# Patient Record
Sex: FEMALE | Race: ASIAN | NOT HISPANIC OR LATINO | URBAN - METROPOLITAN AREA
[De-identification: names, ages, dates, MRNs, and addresses within clinical notes are randomized per-mention and may not be internally consistent; named-entity substitution may affect disease eponyms.]

---

## 2018-07-28 ENCOUNTER — INPATIENT (INPATIENT)
Facility: HOSPITAL | Age: 58
LOS: 3 days | Discharge: HOME | DRG: 868 | End: 2018-08-01
Attending: INTERNAL MEDICINE | Admitting: INTERNAL MEDICINE
Payer: MEDICAID

## 2018-07-28 VITALS
TEMPERATURE: 99 F | RESPIRATION RATE: 19 BRPM | SYSTOLIC BLOOD PRESSURE: 136 MMHG | HEART RATE: 138 BPM | HEIGHT: 61 IN | DIASTOLIC BLOOD PRESSURE: 80 MMHG | OXYGEN SATURATION: 100 % | WEIGHT: 102.96 LBS

## 2018-07-28 DIAGNOSIS — R00.2 PALPITATIONS: ICD-10-CM

## 2018-07-28 LAB
ALBUMIN SERPL ELPH-MCNC: 3.6 G/DL — SIGNIFICANT CHANGE UP (ref 3.3–5)
ALP SERPL-CCNC: 145 U/L — HIGH (ref 40–120)
ALT FLD-CCNC: 18 U/L — SIGNIFICANT CHANGE UP (ref 10–45)
ANION GAP SERPL CALC-SCNC: 17 MMOL/L — SIGNIFICANT CHANGE UP (ref 5–17)
ANISOCYTOSIS BLD QL: SIGNIFICANT CHANGE UP
APPEARANCE UR: CLEAR — SIGNIFICANT CHANGE UP
APTT BLD: 31.4 SEC — SIGNIFICANT CHANGE UP (ref 27.5–37.4)
AST SERPL-CCNC: 28 U/L — SIGNIFICANT CHANGE UP (ref 10–40)
BASOPHILS # BLD AUTO: 0 K/UL — SIGNIFICANT CHANGE UP (ref 0–0.2)
BILIRUB SERPL-MCNC: 1.3 MG/DL — HIGH (ref 0.2–1.2)
BILIRUB UR-MCNC: NEGATIVE — SIGNIFICANT CHANGE UP
BUN SERPL-MCNC: 16 MG/DL — SIGNIFICANT CHANGE UP (ref 7–23)
CALCIUM SERPL-MCNC: 9 MG/DL — SIGNIFICANT CHANGE UP (ref 8.4–10.5)
CHLORIDE SERPL-SCNC: 96 MMOL/L — SIGNIFICANT CHANGE UP (ref 96–108)
CO2 SERPL-SCNC: 21 MMOL/L — LOW (ref 22–31)
COLOR SPEC: YELLOW — SIGNIFICANT CHANGE UP
CREAT SERPL-MCNC: 0.76 MG/DL — SIGNIFICANT CHANGE UP (ref 0.5–1.3)
DIFF PNL FLD: NEGATIVE — SIGNIFICANT CHANGE UP
EOSINOPHIL # BLD AUTO: 0 K/UL — SIGNIFICANT CHANGE UP (ref 0–0.5)
GAS PNL BLDV: SIGNIFICANT CHANGE UP
GLUCOSE SERPL-MCNC: 156 MG/DL — HIGH (ref 70–99)
GLUCOSE UR QL: >1000 MG/DL
HCT VFR BLD CALC: 26.1 % — LOW (ref 34.5–45)
HGB BLD-MCNC: 8.2 G/DL — LOW (ref 11.5–15.5)
INR BLD: 1.04 RATIO — SIGNIFICANT CHANGE UP (ref 0.88–1.16)
KETONES UR-MCNC: ABNORMAL
LEUKOCYTE ESTERASE UR-ACNC: NEGATIVE — SIGNIFICANT CHANGE UP
LYMPHOCYTES # BLD AUTO: 2.4 K/UL — SIGNIFICANT CHANGE UP (ref 1–3.3)
LYMPHOCYTES # BLD AUTO: 21 % — SIGNIFICANT CHANGE UP (ref 13–44)
MCHC RBC-ENTMCNC: 30.9 PG — SIGNIFICANT CHANGE UP (ref 27–34)
MCHC RBC-ENTMCNC: 31.6 GM/DL — LOW (ref 32–36)
MCV RBC AUTO: 97.8 FL — SIGNIFICANT CHANGE UP (ref 80–100)
METAMYELOCYTES # FLD: 3 % — HIGH (ref 0–0)
MONOCYTES # BLD AUTO: 1.8 K/UL — HIGH (ref 0–0.9)
MONOCYTES NFR BLD AUTO: 26 % — HIGH (ref 2–14)
MYELOCYTES NFR BLD: 2 % — HIGH (ref 0–0)
NEUTROPHILS # BLD AUTO: 2.8 K/UL — SIGNIFICANT CHANGE UP (ref 1.8–7.4)
NEUTROPHILS NFR BLD AUTO: 45 % — SIGNIFICANT CHANGE UP (ref 43–77)
NEUTS BAND # BLD: 2 % — SIGNIFICANT CHANGE UP (ref 0–8)
NITRITE UR-MCNC: NEGATIVE — SIGNIFICANT CHANGE UP
NRBC # BLD: 3 /100 — HIGH (ref 0–0)
NT-PROBNP SERPL-SCNC: 28 PG/ML — SIGNIFICANT CHANGE UP (ref 0–300)
PH UR: 6.5 — SIGNIFICANT CHANGE UP (ref 5–8)
PLAT MORPH BLD: NORMAL — SIGNIFICANT CHANGE UP
PLATELET # BLD AUTO: 347 K/UL — SIGNIFICANT CHANGE UP (ref 150–400)
POIKILOCYTOSIS BLD QL AUTO: SLIGHT — SIGNIFICANT CHANGE UP
POLYCHROMASIA BLD QL SMEAR: SLIGHT — SIGNIFICANT CHANGE UP
POTASSIUM SERPL-MCNC: 3.7 MMOL/L — SIGNIFICANT CHANGE UP (ref 3.5–5.3)
POTASSIUM SERPL-SCNC: 3.7 MMOL/L — SIGNIFICANT CHANGE UP (ref 3.5–5.3)
PROT SERPL-MCNC: 7.8 G/DL — SIGNIFICANT CHANGE UP (ref 6–8.3)
PROT UR-MCNC: SIGNIFICANT CHANGE UP
PROTHROM AB SERPL-ACNC: 11.4 SEC — SIGNIFICANT CHANGE UP (ref 9.8–12.7)
RBC # BLD: 2.67 M/UL — LOW (ref 3.8–5.2)
RBC # FLD: 19.2 % — HIGH (ref 10.3–14.5)
RBC BLD AUTO: NORMAL — SIGNIFICANT CHANGE UP
RBC CASTS # UR COMP ASSIST: SIGNIFICANT CHANGE UP /HPF (ref 0–2)
SODIUM SERPL-SCNC: 134 MMOL/L — LOW (ref 135–145)
SP GR SPEC: >1.03 — HIGH (ref 1.01–1.02)
TROPONIN T, HIGH SENSITIVITY RESULT: <6 NG/L — SIGNIFICANT CHANGE UP (ref 0–51)
UROBILINOGEN FLD QL: NEGATIVE — SIGNIFICANT CHANGE UP
VARIANT LYMPHS # BLD: 1 % — SIGNIFICANT CHANGE UP (ref 0–6)
WBC # BLD: 7 K/UL — SIGNIFICANT CHANGE UP (ref 3.8–10.5)
WBC # FLD AUTO: 7 K/UL — SIGNIFICANT CHANGE UP (ref 3.8–10.5)
WBC UR QL: SIGNIFICANT CHANGE UP /HPF (ref 0–5)

## 2018-07-28 PROCEDURE — 71045 X-RAY EXAM CHEST 1 VIEW: CPT | Mod: 26

## 2018-07-28 PROCEDURE — 99285 EMERGENCY DEPT VISIT HI MDM: CPT | Mod: 25

## 2018-07-28 PROCEDURE — 93010 ELECTROCARDIOGRAM REPORT: CPT

## 2018-07-28 PROCEDURE — 71275 CT ANGIOGRAPHY CHEST: CPT | Mod: 26

## 2018-07-28 RX ORDER — DEXTROSE 50 % IN WATER 50 %
25 SYRINGE (ML) INTRAVENOUS ONCE
Qty: 0 | Refills: 0 | Status: DISCONTINUED | OUTPATIENT
Start: 2018-07-28 | End: 2018-08-01

## 2018-07-28 RX ORDER — INSULIN LISPRO 100/ML
VIAL (ML) SUBCUTANEOUS
Qty: 0 | Refills: 0 | Status: DISCONTINUED | OUTPATIENT
Start: 2018-07-28 | End: 2018-08-01

## 2018-07-28 RX ORDER — GLUCAGON INJECTION, SOLUTION 0.5 MG/.1ML
1 INJECTION, SOLUTION SUBCUTANEOUS ONCE
Qty: 0 | Refills: 0 | Status: DISCONTINUED | OUTPATIENT
Start: 2018-07-28 | End: 2018-08-01

## 2018-07-28 RX ORDER — SODIUM CHLORIDE 9 MG/ML
1000 INJECTION INTRAMUSCULAR; INTRAVENOUS; SUBCUTANEOUS ONCE
Qty: 0 | Refills: 0 | Status: COMPLETED | OUTPATIENT
Start: 2018-07-28 | End: 2018-07-28

## 2018-07-28 RX ORDER — DEXTROSE 50 % IN WATER 50 %
12.5 SYRINGE (ML) INTRAVENOUS ONCE
Qty: 0 | Refills: 0 | Status: DISCONTINUED | OUTPATIENT
Start: 2018-07-28 | End: 2018-08-01

## 2018-07-28 RX ORDER — DEXTROSE 50 % IN WATER 50 %
15 SYRINGE (ML) INTRAVENOUS ONCE
Qty: 0 | Refills: 0 | Status: DISCONTINUED | OUTPATIENT
Start: 2018-07-28 | End: 2018-08-01

## 2018-07-28 RX ORDER — SODIUM CHLORIDE 9 MG/ML
1000 INJECTION INTRAMUSCULAR; INTRAVENOUS; SUBCUTANEOUS
Qty: 0 | Refills: 0 | Status: DISCONTINUED | OUTPATIENT
Start: 2018-07-28 | End: 2018-07-31

## 2018-07-28 RX ORDER — ATORVASTATIN CALCIUM 80 MG/1
10 TABLET, FILM COATED ORAL AT BEDTIME
Qty: 0 | Refills: 0 | Status: DISCONTINUED | OUTPATIENT
Start: 2018-07-28 | End: 2018-08-01

## 2018-07-28 RX ORDER — SODIUM CHLORIDE 9 MG/ML
3 INJECTION INTRAMUSCULAR; INTRAVENOUS; SUBCUTANEOUS ONCE
Qty: 0 | Refills: 0 | Status: COMPLETED | OUTPATIENT
Start: 2018-07-28 | End: 2018-07-28

## 2018-07-28 RX ORDER — SODIUM CHLORIDE 9 MG/ML
1000 INJECTION, SOLUTION INTRAVENOUS
Qty: 0 | Refills: 0 | Status: DISCONTINUED | OUTPATIENT
Start: 2018-07-28 | End: 2018-08-01

## 2018-07-28 RX ADMIN — SODIUM CHLORIDE 3 MILLILITER(S): 9 INJECTION INTRAMUSCULAR; INTRAVENOUS; SUBCUTANEOUS at 13:59

## 2018-07-28 RX ADMIN — ATORVASTATIN CALCIUM 10 MILLIGRAM(S): 80 TABLET, FILM COATED ORAL at 21:28

## 2018-07-28 RX ADMIN — SODIUM CHLORIDE 1000 MILLILITER(S): 9 INJECTION INTRAMUSCULAR; INTRAVENOUS; SUBCUTANEOUS at 13:59

## 2018-07-28 NOTE — ED ADULT NURSE REASSESSMENT NOTE - NS ED NURSE REASSESS COMMENT FT1
Received awake alert and orientedx3 Resp even and nonlab Denies chest pain or sob CTA done Pt states " I feel much better," per daughter

## 2018-07-28 NOTE — ED ADULT NURSE NOTE - NSSISCREENINGQ4_ED_A_ED
General Clinic Visit  Date: 5/10/2017  PCP: Alysa Jones DO    Chief Complaint   Patient presents with   • Skin Tag   • ER F/U     kidney stone    • Ankle Injury       HPI: Fátima Wetzel is a 43 year old year old female with a PMHx of skin tags presenting to the [facility] unaccompanied for the following:    ED F/U:  - day after our last visit today, she woke up and got out of bed and sprained her ankle. Was evaled in the ED - no fx. Has been able to go without brace at home, but still wears it outside of the home  -THEN had nephrolithiasis, seen in ED at [facility], passed stone in the ED, no subsequent problems    Skin tag removal:  - has two irritated skin tags near bra line that have been getting caught and causing pain/irritation  - she is here for removal of those today      ROS:  no fever, headache, chest pain, SOB, abdominal pain, N/V/D/C, peripheral edema     Problem List, Pertinent surgical, family history, Allergies, Current Medications, as well as nursing notes were all reviewed.    PHYSICAL EXAM:  Visit Vitals   • /80 [place] [facility] [place]itting, Cuff Size: Regular)   • Pulse 72   • Temp 97.2 °F (36.2 °C) (Tympanic)   • Resp 20   • Wt 107.3 kg   • LMP 01/14/2016   • SpO2 99%   • BMI 33.93 kg/m2     General: NAD, pleasant and interactive  Skin: warm and dry, no rash, 3 mm skin tag lower outer quadrant right breast and 3 mm skin tag just anterior to midaxillary line at inferior aspect of axilla.  HEENT: sclera noninjected, PERRL,mucous membranes moist  Neck:  supple, no lymphadenopathy  CV: RRR, normal S1/S2, no murmurs/rubs/gallops  Resp: CTAB, no wheeze/rales/rhonchi  Abd: soft, NT/ND, normoactive bowel sounds throughout, no rebound or guarding  Msk: no cyanosis or edema, moves all extremities spontaneously. Left ankle only very slightly swollen. Neurovascularly intact. Good ROM. Slightly TTP.   Neuro: CN 2-12 grossly intact, no focal deficits  Psych: mood and affect  appropriate    ASSESSMENT & PLAN:  Fátima was seen today for skin tag, er f/u and ankle injury.    Diagnoses and all orders for this visit:    Benign neoplasm of skin of trunk  -     DESTRUCTION BENIGN LESIONS 1-14 LESIONS [[id number]]  - Procedure:    - 2 skin tags identified. Verbal informed consent obtained. Liquid nitrogen applied via q-tip until ice ball formed and extended 2 mm beyond outer edge of base of skin tag x 2 cycles. Patient tolerated well. Anticipatory guidance about progression of lesion healing discussed in detail.     Sprain of left ankle, unspecified ligament, subsequent encounter  Healing appropriately. Discussed ROM exercises - abc's. OK to d/c brace as tolerated.    Nephrolithiasis   Resolved. continue to drink plenty of water with lemon.     Health Maintenance:  · Cervical Cancer Screening - NA, had total hysterectomy 2016  · Mammogram - due 9/2017  · Immunizations: Up to date.   · HIV screening ages 15-65: NR 2/5/13  · High Blood pressure screening: WNL today    FOLLOW-UP:  Patient is to return in 1-2 months for discussion about weight loss.    Staffed with Dr. Daniels, who is in agreement with the above assessment and plan.    Alysa Jones, DO  PGY3 Family Medicine  5/10/2017     No

## 2018-07-28 NOTE — ED PROVIDER NOTE - MEDICAL DECISION MAKING DETAILS
Attending MD Lloyd: 58F with ho DM, HTN presenting with palpitations x 2-3 weeks, fatigue, weight loss, OLIVARES, NO chest pain contrary to triage note, pt tachy to 130s, sinus tach, inferolateral TWI, ddx includes PE, thyrotoxicosis, lyte derangements, less likely ACS. Plan for CTA chest given high suspicion for PE, labs, TSH, CXR and re-eval

## 2018-07-28 NOTE — ED ADULT NURSE NOTE - OBJECTIVE STATEMENT
Pt reports 3 wks of weakness, dizziness that occurred 3 wks ago and fell hitting the back of head. Reports 3 wks of overall tiredness and weak. Reports slight chest discomfort with the feeling of heart racing, denies chest pain. Pt on CM, Sinus Tachycardia, denies sob, fever, chills, headache, dizziness recently. Pt speech clear, strength to all four extr. IV placed, on CM.

## 2018-07-28 NOTE — ED PROVIDER NOTE - PHYSICAL EXAMINATION
NAD, Tachycardia, Afebrile, + PERRL, No spinal tender, ABD soft, non tender, no CVA tender, Neuro- intact.

## 2018-07-28 NOTE — H&P ADULT - HISTORY OF PRESENT ILLNESS
57yo female pt with PMHx of HTN, HLD, DM c/o palpitation, fatigue and OLIVARES for 2 weeks  . Pt stated she felt racing heart beats 2 weeks ago and they've worsen gradually with fatigue/ OLIVARES since one week  . Pt's evaluated by urgent care  today and sent to ED.   Denies CP or syncopal episode. Denies headache, dizziness or visual changes.   Denies neck or back pain. Denies sensory changes or weakness to extremities. Denies fever, chills

## 2018-07-28 NOTE — ED ADULT TRIAGE NOTE - CHIEF COMPLAINT QUOTE
She felt like her heart was racing and [facility] and [facility] here. Pt denies chest pain or sob

## 2018-07-28 NOTE — H&P ADULT - PMH
Essential hypertension    Type 2 diabetes mellitus with other neurologic complication, without long-term current use of insulin

## 2018-07-28 NOTE — ED PROVIDER NOTE - OBJECTIVE STATEMENT
59yo female pt with PMHx of HTN, HLD, DM c/o palpitation, fatigue and OLIVARES for 2 weeks. Pt stated she felt mild racing heart beats 2 weeks ago and they've worsen gradually with fatigue/ OLIVARES since one week. Pt's evaluated by UC today and sent to ED. Denies CP or syncopal episode. Denies headache, dizziness or visual changes. Denies neck or back pain. Denies sensory changes or weakness to extremities. Denies fever, chills or cough. Denies urinary problem.

## 2018-07-28 NOTE — H&P ADULT - ASSESSMENT
pt w/ weakness fatigue found to be tachycardic and anemic  guauaic neg as per er  anemia w/u  gi eval  hold bp meds  check tsh/  echo  orthostatics  cards eval  tele  dm  fsg riss  hold oral meds

## 2018-07-28 NOTE — ED ADULT NURSE NOTE - CHPI ED SYMPTOMS NEG
no nausea/no syncope/no vomiting/no chest pain/no cough/no fever/no diaphoresis/no chills/no back pain/no shortness of breath

## 2018-07-28 NOTE — H&P ADULT - NSHPLABSRESULTS_GEN_ALL_CORE
8.2    7.0   )-----------( 347      ( 28 Jul 2018 14:00 )             26.1       07-28    134<L>  |  96  |  16  ----------------------------<  156<H>  3.7   |  21<L>  |  0.76    Ca    9.0      28 Jul 2018 14:00    TPro  7.8  /  Alb  3.6  /  TBili  1.3<H>  /  DBili  x   /  AST  28  /  ALT  18  /  AlkPhos  145<H>  07-28                  PT/INR - ( 28 Jul 2018 14:00 )   PT: 11.4 sec;   INR: 1.04 ratio         PTT - ( 28 Jul 2018 14:00 )  PTT:31.4 sec    Lactate Trend            CAPILLARY BLOOD GLUCOSE      POCT Blood Glucose.: 266 mg/dL (28 Jul 2018 18:29)

## 2018-07-28 NOTE — ED PROVIDER NOTE - ATTENDING CONTRIBUTION TO CARE
Attending MD Lloyd:  I personally have seen and examined this patient.  NP note reviewed and agree on plan of care and except where noted.  See HPI, PE, and MDM for details.

## 2018-07-29 DIAGNOSIS — D64.9 ANEMIA, UNSPECIFIED: ICD-10-CM

## 2018-07-29 LAB
ANION GAP SERPL CALC-SCNC: 11 MMOL/L — SIGNIFICANT CHANGE UP (ref 5–17)
BILIRUB DIRECT SERPL-MCNC: 0.3 MG/DL — HIGH (ref 0–0.2)
BILIRUB INDIRECT FLD-MCNC: 0.8 MG/DL — SIGNIFICANT CHANGE UP (ref 0.2–1)
BILIRUB SERPL-MCNC: 1.1 MG/DL — SIGNIFICANT CHANGE UP (ref 0.2–1.2)
BLD GP AB SCN SERPL QL: NEGATIVE — SIGNIFICANT CHANGE UP
BUN SERPL-MCNC: 11 MG/DL — SIGNIFICANT CHANGE UP (ref 7–23)
CALCIUM SERPL-MCNC: 7.8 MG/DL — LOW (ref 8.4–10.5)
CHLORIDE SERPL-SCNC: 103 MMOL/L — SIGNIFICANT CHANGE UP (ref 96–108)
CO2 SERPL-SCNC: 22 MMOL/L — SIGNIFICANT CHANGE UP (ref 22–31)
CREAT SERPL-MCNC: 0.5 MG/DL — SIGNIFICANT CHANGE UP (ref 0.5–1.3)
DAT POLY-SP REAG RBC QL: NEGATIVE — SIGNIFICANT CHANGE UP
FERRITIN SERPL-MCNC: 1149 NG/ML — HIGH (ref 15–150)
FOLATE SERPL-MCNC: 11.9 NG/ML — SIGNIFICANT CHANGE UP
FOLATE SERPL-MCNC: 13.1 NG/ML — SIGNIFICANT CHANGE UP
GLUCOSE SERPL-MCNC: 127 MG/DL — HIGH (ref 70–99)
HAPTOGLOB SERPL-MCNC: <20 MG/DL — LOW (ref 34–200)
HAPTOGLOB SERPL-MCNC: <20 MG/DL — LOW (ref 34–200)
HBA1C BLD-MCNC: 4.5 % — SIGNIFICANT CHANGE UP (ref 4–5.6)
HCT VFR BLD CALC: 22.6 % — LOW (ref 34.5–45)
HCT VFR BLD CALC: 22.7 % — LOW (ref 34.5–45)
HGB BLD-MCNC: 6.9 G/DL — CRITICAL LOW (ref 11.5–15.5)
HGB BLD-MCNC: 7.5 G/DL — LOW (ref 11.5–15.5)
IRON SATN MFR SERPL: 24 % — SIGNIFICANT CHANGE UP (ref 14–50)
IRON SATN MFR SERPL: 55 UG/DL — SIGNIFICANT CHANGE UP (ref 30–160)
MCHC RBC-ENTMCNC: 30.5 GM/DL — LOW (ref 32–36)
MCHC RBC-ENTMCNC: 31.2 PG — SIGNIFICANT CHANGE UP (ref 27–34)
MCHC RBC-ENTMCNC: 32.3 PG — SIGNIFICANT CHANGE UP (ref 27–34)
MCHC RBC-ENTMCNC: 33.1 GM/DL — SIGNIFICANT CHANGE UP (ref 32–36)
MCV RBC AUTO: 102.3 FL — HIGH (ref 80–100)
MCV RBC AUTO: 97.6 FL — SIGNIFICANT CHANGE UP (ref 80–100)
NRBC # BLD: 2 /100 WBCS — HIGH (ref 0–0)
PLATELET # BLD AUTO: 362 K/UL — SIGNIFICANT CHANGE UP (ref 150–400)
PLATELET # BLD AUTO: 368 K/UL — SIGNIFICANT CHANGE UP (ref 150–400)
POTASSIUM SERPL-MCNC: 3.6 MMOL/L — SIGNIFICANT CHANGE UP (ref 3.5–5.3)
POTASSIUM SERPL-SCNC: 3.6 MMOL/L — SIGNIFICANT CHANGE UP (ref 3.5–5.3)
RBC # BLD: 2.21 M/UL — LOW (ref 3.8–5.2)
RBC # BLD: 2.33 M/UL — LOW (ref 3.8–5.2)
RBC # FLD: 18.9 % — HIGH (ref 10.3–14.5)
RBC # FLD: 22.3 % — HIGH (ref 10.3–14.5)
RH IG SCN BLD-IMP: POSITIVE — SIGNIFICANT CHANGE UP
RH IG SCN BLD-IMP: POSITIVE — SIGNIFICANT CHANGE UP
SODIUM SERPL-SCNC: 136 MMOL/L — SIGNIFICANT CHANGE UP (ref 135–145)
TIBC SERPL-MCNC: 233 UG/DL — SIGNIFICANT CHANGE UP (ref 220–430)
TSH SERPL-MCNC: 1.16 UIU/ML — SIGNIFICANT CHANGE UP (ref 0.27–4.2)
TSH SERPL-MCNC: 1.63 UIU/ML — SIGNIFICANT CHANGE UP (ref 0.27–4.2)
UIBC SERPL-MCNC: 178 UG/DL — SIGNIFICANT CHANGE UP (ref 110–370)
VIT B12 SERPL-MCNC: 976 PG/ML — SIGNIFICANT CHANGE UP (ref 232–1245)
WBC # BLD: 4 K/UL — SIGNIFICANT CHANGE UP (ref 3.8–10.5)
WBC # BLD: 5 K/UL — SIGNIFICANT CHANGE UP (ref 3.8–10.5)
WBC # FLD AUTO: 4 K/UL — SIGNIFICANT CHANGE UP (ref 3.8–10.5)
WBC # FLD AUTO: 5 K/UL — SIGNIFICANT CHANGE UP (ref 3.8–10.5)

## 2018-07-29 PROCEDURE — 99255 IP/OBS CONSLTJ NEW/EST HI 80: CPT | Mod: GC

## 2018-07-29 PROCEDURE — 70450 CT HEAD/BRAIN W/O DYE: CPT | Mod: 26

## 2018-07-29 RX ORDER — SOD SULF/SODIUM/NAHCO3/KCL/PEG
4000 SOLUTION, RECONSTITUTED, ORAL ORAL ONCE
Qty: 0 | Refills: 0 | Status: COMPLETED | OUTPATIENT
Start: 2018-07-29 | End: 2018-07-29

## 2018-07-29 RX ADMIN — ATORVASTATIN CALCIUM 10 MILLIGRAM(S): 80 TABLET, FILM COATED ORAL at 21:22

## 2018-07-29 RX ADMIN — SODIUM CHLORIDE 60 MILLILITER(S): 9 INJECTION INTRAMUSCULAR; INTRAVENOUS; SUBCUTANEOUS at 05:42

## 2018-07-29 RX ADMIN — Medication 4000 MILLILITER(S): at 15:00

## 2018-07-29 NOTE — CONSULT NOTE ADULT - SUBJECTIVE AND OBJECTIVE BOX
CHIEF COMPLAINT:Patient is a 58y old  Female who presents with a chief complaint of     HISTORY OF PRESENT ILLNESS:  58 female with history as below admitted with palpitation, fatigue   and OLIVARES  found to have profound anemia  denies any chest pain     PAST MEDICAL & SURGICAL HISTORY:  Essential hypertension  Type 2 diabetes mellitus with other neurologic complication, without long-term current use of insulin          MEDICATIONS:            atorvastatin 10 milliGRAM(s) Oral at bedtime  dextrose 40% Gel 15 Gram(s) Oral once PRN  dextrose 50% Injectable 12.5 Gram(s) IV Push once  dextrose 50% Injectable 25 Gram(s) IV Push once  dextrose 50% Injectable 25 Gram(s) IV Push once  glucagon  Injectable 1 milliGRAM(s) IntraMuscular once PRN  insulin lispro (HumaLOG) corrective regimen sliding scale   SubCutaneous three times a day before meals    dextrose 5%. 1000 milliLiter(s) IV Continuous <Continuous>  sodium chloride 0.9%. 1000 milliLiter(s) IV Continuous <Continuous>      FAMILY HISTORY:      Non-contributory    SOCIAL HISTORY:    No tobacco, drugs or etoh    Allergies    No Known Allergies    Intolerances    	    REVIEW OF SYSTEMS:  as above  The rest of the 14 points ROS reviewed and except above they are unremarkable.        PHYSICAL EXAM:  T(C): 36.7 (07-29-18 @ 04:21), Max: 37.1 (07-28-18 @ 18:30)  HR: 97 (07-29-18 @ 04:21) (97 - 138)  BP: 117/73 (07-29-18 @ 04:21) (114/76 - 136/80)  RR: 18 (07-29-18 @ 04:21) (18 - 20)  SpO2: 98% (07-29-18 @ 04:21) (97% - 100%)  Wt(kg): --  I&O's Summary    28 Jul 2018 07:01  -  29 Jul 2018 07:00  --------------------------------------------------------  IN: 660 mL / OUT: 0 mL / NET: 660 mL        Appearance: Normal	  HEENT:   Normal oral mucosa, PERRL, EOMI	  Cardiovascular: Normal S1 S2,    Murmur:   Neck: JVP normal  Respiratory: Lungs clear to auscultation  Gastrointestinal:  Soft, Non-tender, + BS	  Skin: normal   Neuro: No gross deficits.   Psychiatry:  Mood & affect appropriate  Ext: No edema    LABS/DATA:    TELEMETRY: 	    ECG:  	   	  CARDIAC MARKERS:                        <6 <<== 07-28-18 @ 14:00                              8.2    7.0   )-----------( 347      ( 28 Jul 2018 14:00 )             26.1     07-29    136  |  103  |  11  ----------------------------<  127<H>  3.6   |  22  |  0.50    Ca    7.8<L>      29 Jul 2018 05:56    TPro  7.8  /  Alb  3.6  /  TBili  1.3<H>  /  DBili  x   /  AST  28  /  ALT  18  /  AlkPhos  145<H>  07-28    proBNP: Serum Pro-Brain Natriuretic Peptide: 28 pg/mL (07-28 @ 14:00)    Lipid Profile:   HgA1c:   TSH: Thyroid Stimulating Hormone, Serum: 1.16 uIU/mL (07-28 @ 15:01)

## 2018-07-29 NOTE — PROVIDER CONTACT NOTE (CRITICAL VALUE NOTIFICATION) - ACTION/TREATMENT ORDERED:
will continue to monitor pt closely. stat repeat cbc and t&s ordered,will continue to monitor pt closely.

## 2018-07-29 NOTE — PROGRESS NOTE ADULT - ASSESSMENT
pt w/ weakness fatigue found to be tachycardic and anemic  guauaic neg as per er  anemia w/u  gi eval noted  Hb drop noted, possible scope  hold bp meds  TSH normal  echo  orthostatics neg  cards eval noted  tele  dm  fsg riss  hold oral meds  Heme eval

## 2018-07-29 NOTE — CONSULT NOTE ADULT - ASSESSMENT
Problem 1: Anemia  - Progressive weakness with reported normal blood counts 1 month ago  - Suspected hemolytic anemia, Retic 21%, Haptoglobin < 20, TBili mildly elevated iron studies normal  - Please send direct and direct jude test, LDH, fractionated bilirubin  - Will evaluate peripheral smear  - In setting of recent hiking, need rule out tick related illness: babesiosis  - Try to get records for most recent CBC Problem 1: Anemia  - Progressive weakness with reported normal blood counts 1 month ago  - Suspected hemolytic anemia, Retic 21%, Haptoglobin < 20, TBili mildly elevated iron studies normal  - Please send direct and direct jude test, LDH, fractionated bilirubin  - In setting of recent hiking, need rule out tick related illness: babesiosis  - Peripheral Smear Reviewed: RBCs with inclusion bodies suggestive of babesiosis  - Consider empirically treating for babesiosis infection, send confirmatory serological or PCR studies  - Recommend review of outpatient records for most recent CBC

## 2018-07-29 NOTE — CONSULT NOTE ADULT - SUBJECTIVE AND OBJECTIVE BOX
HPI:  57 yo female with PMHx of HTN, HLD, DM initially presented to the ED with complaint of fast heart rate and palpitations. This has been going on for a 3-4 weeks with elevated heart rate, fatigue and dyspnea on exertion that has been progressively worsening. She came into the ED after going to urgent care with a HR of 130. She admits to a fall 1 month ago related to dizziness and use of nyquil. At that time she had a large scalp hematoma, went to PMD who noticed "liver tests" were mildly elevated. Patient is normally very active, spends a great deal of time in her garden and also Hikes with her daughter. Denies blood in urine or stool, bleeding tendencies recent illnesses, chest pain, headache, current dizziness, but gets short of breath with palpitation walking 10 feet.     In the ED she was evaluated for GI bleeding, but was reported to be guaiac negative. Hematology consulted to evaluate for other causes    PAST MEDICAL & SURGICAL HISTORY:  Essential hypertension  Type 2 diabetes mellitus with other neurologic complication, without long-term current use of insulin    General: denies fevers, chills, +Fatgiue  Skin/Breast: denies rash   Ophthalmologic: denies blurry vision  ENMT: denies throat pain  Respiratory and Thorax: denies cough, denies shortness of breath  Cardiovascular: Denies LE swelling, denies chest pain, + palpitations  Gastrointestinal: denies abdominal pain/ nausea/ vomiting/ diarrhea. Denies BRBPR/ melena   Genitourinary: Denies dysuria  Musculoskeletal: Denies mylagias   Neurological: Denies syncope  Psychiatric: Denies mood disturbance   Hematology/Lymphatics: denies bleeding/bruising. Denies skin lumps 	    MEDICATIONS  (STANDING):  atorvastatin 10 milliGRAM(s) Oral at bedtime  dextrose 5%. 1000 milliLiter(s) (50 mL/Hr) IV Continuous <Continuous>  dextrose 50% Injectable 12.5 Gram(s) IV Push once  dextrose 50% Injectable 25 Gram(s) IV Push once  dextrose 50% Injectable 25 Gram(s) IV Push once  insulin lispro (HumaLOG) corrective regimen sliding scale   SubCutaneous three times a day before meals  polyethylene glycol/electrolyte Solution. 4000 milliLiter(s) Oral once  sodium chloride 0.9%. 1000 milliLiter(s) (60 mL/Hr) IV Continuous <Continuous>    MEDICATIONS  (PRN):  dextrose 40% Gel 15 Gram(s) Oral once PRN Blood Glucose LESS THAN 70 milliGRAM(s)/deciliter  glucagon  Injectable 1 milliGRAM(s) IntraMuscular once PRN Glucose LESS THAN 70 milligrams/deciliter    Allergies  No Known Allergies    Vital Signs Last 24 Hrs  T(C): 36.7 (29 Jul 2018 13:32), Max: 37.1 (28 Jul 2018 18:30)  T(F): 98.1 (29 Jul 2018 13:32), Max: 98.7 (28 Jul 2018 18:30)  HR: 105 (29 Jul 2018 13:32) (97 - 126)  BP: 128/78 (29 Jul 2018 13:32) (114/76 - 128/78)  RR: 17 (29 Jul 2018 13:32) (17 - 20)  SpO2: 98% (29 Jul 2018 13:32) (97% - 99%)    PHYSICAL EXAM:  Constitutional: well developed, well nourished, in no acute distress  Eyes: PERRL, EOMI, anicteric, + Conjunctival pallor  ENT: Oropharynx is unremarkable, moist mucus membranes.   Pulmonary: Clear to auscultation bilaterally  Cardiac: RRR, normal S1S2, no murmurs, rubs, gallops.   Vascular:  No venous stasis changes, Radial and Dorsal Pedis pulses palpable.  Abdomen: Normoactive bowel sounds, soft and nontender, no hepatosplenomegaly or masses appreciated.  Lymphatic: No peripheral adenopathy or lymphedema appreciated.   Musculoskeletal: no deformities appreciated.   Skin: normal appearance, dry and warm  Neurology: AAOx3     LABS:                        7.5    5.0   )-----------( 362      ( 29 Jul 2018 11:29 )             22.7     07-29    136  |  103  |  11  ----------------------------<  127<H>  3.6   |  22  |  0.50    Ca    7.8<L>      29 Jul 2018 05:56    TPro  7.8  /  Alb  3.6  /  TBili  1.3<H>  /  DBili  x   /  AST  28  /  ALT  18  /  AlkPhos  145<H>  07-28    PT/INR - ( 28 Jul 2018 14:00 )   PT: 11.4 sec;   INR: 1.04 ratio         PTT - ( 28 Jul 2018 14:00 )  PTT:31.4 sec  Urinalysis Basic - ( 28 Jul 2018 22:06 )    Color: Yellow / Appearance: Clear / SG: >1.030 / pH: x  Gluc: x / Ketone: Moderate  / Bili: Negative / Urobili: Negative   Blood: x / Protein: Trace / Nitrite: Negative   Leuk Esterase: Negative / RBC: 0-2 /HPF / WBC 0-2 /HPF   Sq Epi: x / Non Sq Epi: x / Bacteria: x      Reticulocyte Percent: 21.2 % (07-29 @ 11:29)  Folate, Serum: 13.1 ng/mL (07-29 @ 08:39)      RADIOLOGY & ADDITIONAL STUDIES: HPI:  59 yo female with PMHx of HTN, HLD, DM initially presented to the ED with complaint of fast heart rate and palpitations. This has been going on for a 3-4 weeks with elevated heart rate, fatigue and dyspnea on exertion that has been progressively worsening. She came into the ED after going to urgent care with a HR of 130. She admits to a fall 1 month ago related to dizziness and use of nyquil. At that time she had a large scalp hematoma, went to PMD who noticed "liver tests" were mildly elevated. Patient is normally very active, spends a great deal of time in her garden and also Hikes with her daughter. Denies blood in urine or stool, bleeding tendencies recent illnesses, chest pain, headache, current dizziness, but gets short of breath with palpitation walking 10 feet. No recent travel out of [place].    In the ED she was evaluated for GI bleeding, but was reported to be guaiac negative. Hematology consulted to evaluate for other causes    PAST MEDICAL & SURGICAL HISTORY:  Essential hypertension  Type 2 diabetes mellitus with other neurologic complication, without long-term current use of insulin    General: denies fevers, chills, +Fatgiue  Skin/Breast: denies rash   Ophthalmologic: denies blurry vision  ENMT: denies throat pain  Respiratory and Thorax: denies cough, denies shortness of breath  Cardiovascular: Denies LE swelling, denies chest pain, + palpitations  Gastrointestinal: denies abdominal pain/ nausea/ vomiting/ diarrhea. Denies BRBPR/ melena   Genitourinary: Denies dysuria  Musculoskeletal: Denies mylagias   Neurological: Denies syncope  Psychiatric: Denies mood disturbance   Hematology/Lymphatics: denies bleeding/bruising. Denies skin lumps 	    MEDICATIONS  (STANDING):  atorvastatin 10 milliGRAM(s) Oral at bedtime  dextrose 5%. 1000 milliLiter(s) (50 mL/Hr) IV Continuous <Continuous>  dextrose 50% Injectable 12.5 Gram(s) IV Push once  dextrose 50% Injectable 25 Gram(s) IV Push once  dextrose 50% Injectable 25 Gram(s) IV Push once  insulin lispro (HumaLOG) corrective regimen sliding scale   SubCutaneous three times a day before meals  polyethylene glycol/electrolyte Solution. 4000 milliLiter(s) Oral once  sodium chloride 0.9%. 1000 milliLiter(s) (60 mL/Hr) IV Continuous <Continuous>    MEDICATIONS  (PRN):  dextrose 40% Gel 15 Gram(s) Oral once PRN Blood Glucose LESS THAN 70 milliGRAM(s)/deciliter  glucagon  Injectable 1 milliGRAM(s) IntraMuscular once PRN Glucose LESS THAN 70 milligrams/deciliter    Allergies  No Known Allergies    Vital Signs Last 24 Hrs  T(C): 36.7 (29 Jul 2018 13:32), Max: 37.1 (28 Jul 2018 18:30)  T(F): 98.1 (29 Jul 2018 13:32), Max: 98.7 (28 Jul 2018 18:30)  HR: 105 (29 Jul 2018 13:32) (97 - 126)  BP: 128/78 (29 Jul 2018 13:32) (114/76 - 128/78)  RR: 17 (29 Jul 2018 13:32) (17 - 20)  SpO2: 98% (29 Jul 2018 13:32) (97% - 99%)    PHYSICAL EXAM:  Constitutional: well developed, well nourished, in no acute distress  Eyes: PERRL, EOMI, anicteric, + Conjunctival pallor  ENT: Oropharynx is unremarkable, moist mucus membranes.   Pulmonary: Clear to auscultation bilaterally  Cardiac: RRR, normal S1S2, no murmurs, rubs, gallops.   Vascular:  No venous stasis changes, Radial and Dorsal Pedis pulses palpable.  Abdomen: Normoactive bowel sounds, soft and nontender, no hepatosplenomegaly or masses appreciated.  Lymphatic: No peripheral adenopathy or lymphedema appreciated.   Musculoskeletal: no deformities appreciated.   Skin: normal appearance, dry and warm  Neurology: AAOx3     LABS:                        7.5    5.0   )-----------( 362      ( 29 Jul 2018 11:29 )             22.7     07-29    136  |  103  |  11  ----------------------------<  127<H>  3.6   |  22  |  0.50    Ca    7.8<L>      29 Jul 2018 05:56    TPro  7.8  /  Alb  3.6  /  TBili  1.3<H>  /  DBili  x   /  AST  28  /  ALT  18  /  AlkPhos  145<H>  07-28    PT/INR - ( 28 Jul 2018 14:00 )   PT: 11.4 sec;   INR: 1.04 ratio         PTT - ( 28 Jul 2018 14:00 )  PTT:31.4 sec  Urinalysis Basic - ( 28 Jul 2018 22:06 )    Color: Yellow / Appearance: Clear / SG: >1.030 / pH: x  Gluc: x / Ketone: Moderate  / Bili: Negative / Urobili: Negative   Blood: x / Protein: Trace / Nitrite: Negative   Leuk Esterase: Negative / RBC: 0-2 /HPF / WBC 0-2 /HPF   Sq Epi: x / Non Sq Epi: x / Bacteria: x      Reticulocyte Percent: 21.2 % (07-29 @ 11:29)  Folate, Serum: 13.1 ng/mL (07-29 @ 08:39)      RADIOLOGY & ADDITIONAL STUDIES:

## 2018-07-29 NOTE — CONSULT NOTE ADULT - ATTENDING COMMENTS
Previously healthy patient with 4 weeks of symptoms including tachycardia found to have anemia, consistent with hemolytic anemia with elevated bili, increased retic count and decreased haptoglobin.  She regularly hikes and does a lot of gardening which suggests a risk for exposure to tick borne illness and PBS suggests babesiosis. Will confirm with serology but would treat empirically now.

## 2018-07-29 NOTE — CONSULT NOTE ADULT - ASSESSMENT
palpitation , OLIVARES  due to profound anemia  agree with echo  anemia work up  GI/Heme eval  will follow up

## 2018-07-30 LAB
ANION GAP SERPL CALC-SCNC: 11 MMOL/L — SIGNIFICANT CHANGE UP (ref 5–17)
BUN SERPL-MCNC: 5 MG/DL — LOW (ref 7–23)
CALCIUM SERPL-MCNC: 8.1 MG/DL — LOW (ref 8.4–10.5)
CHLORIDE SERPL-SCNC: 103 MMOL/L — SIGNIFICANT CHANGE UP (ref 96–108)
CO2 SERPL-SCNC: 24 MMOL/L — SIGNIFICANT CHANGE UP (ref 22–31)
CREAT SERPL-MCNC: 0.45 MG/DL — LOW (ref 0.5–1.3)
GLUCOSE SERPL-MCNC: 119 MG/DL — HIGH (ref 70–99)
HCT VFR BLD CALC: 22.1 % — LOW (ref 34.5–45)
HCT VFR BLD CALC: 22.2 % — LOW (ref 34.5–45)
HGB BLD-MCNC: 6.8 G/DL — CRITICAL LOW (ref 11.5–15.5)
HGB BLD-MCNC: 7 G/DL — CRITICAL LOW (ref 11.5–15.5)
MCHC RBC-ENTMCNC: 30.6 GM/DL — LOW (ref 32–36)
MCHC RBC-ENTMCNC: 31.2 PG — SIGNIFICANT CHANGE UP (ref 27–34)
MCHC RBC-ENTMCNC: 31.8 PG — SIGNIFICANT CHANGE UP (ref 27–34)
MCHC RBC-ENTMCNC: 31.9 GM/DL — LOW (ref 32–36)
MCV RBC AUTO: 101.8 FL — HIGH (ref 80–100)
MCV RBC AUTO: 99.5 FL — SIGNIFICANT CHANGE UP (ref 80–100)
NRBC # BLD: 4 /100 WBCS — HIGH (ref 0–0)
OB PNL STL: NEGATIVE — SIGNIFICANT CHANGE UP
PLATELET # BLD AUTO: 349 K/UL — SIGNIFICANT CHANGE UP (ref 150–400)
PLATELET # BLD AUTO: 350 K/UL — SIGNIFICANT CHANGE UP (ref 150–400)
POTASSIUM SERPL-MCNC: 3.3 MMOL/L — LOW (ref 3.5–5.3)
POTASSIUM SERPL-SCNC: 3.3 MMOL/L — LOW (ref 3.5–5.3)
RBC # BLD: 2.18 M/UL — LOW (ref 3.8–5.2)
RBC # BLD: 2.22 M/UL — LOW (ref 3.8–5.2)
RBC # FLD: 19.4 % — HIGH (ref 10.3–14.5)
RBC # FLD: 21.9 % — HIGH (ref 10.3–14.5)
SODIUM SERPL-SCNC: 138 MMOL/L — SIGNIFICANT CHANGE UP (ref 135–145)
TRANSFERRIN SERPL-MCNC: 189 MG/DL — LOW (ref 200–360)
WBC # BLD: 3.69 K/UL — LOW (ref 3.8–10.5)
WBC # BLD: 4.2 K/UL — SIGNIFICANT CHANGE UP (ref 3.8–10.5)
WBC # FLD AUTO: 3.69 K/UL — LOW (ref 3.8–10.5)
WBC # FLD AUTO: 4.2 K/UL — SIGNIFICANT CHANGE UP (ref 3.8–10.5)

## 2018-07-30 PROCEDURE — 99232 SBSQ HOSP IP/OBS MODERATE 35: CPT | Mod: GC

## 2018-07-30 PROCEDURE — 93306 TTE W/DOPPLER COMPLETE: CPT | Mod: 26

## 2018-07-30 RX ORDER — AZITHROMYCIN 500 MG/1
250 TABLET, FILM COATED ORAL EVERY 24 HOURS
Qty: 0 | Refills: 0 | Status: DISCONTINUED | OUTPATIENT
Start: 2018-07-31 | End: 2018-08-01

## 2018-07-30 RX ORDER — POTASSIUM CHLORIDE 20 MEQ
40 PACKET (EA) ORAL EVERY 4 HOURS
Qty: 0 | Refills: 0 | Status: COMPLETED | OUTPATIENT
Start: 2018-07-30 | End: 2018-07-30

## 2018-07-30 RX ORDER — AZITHROMYCIN 500 MG/1
500 TABLET, FILM COATED ORAL ONCE
Qty: 0 | Refills: 0 | Status: COMPLETED | OUTPATIENT
Start: 2018-07-30 | End: 2018-07-30

## 2018-07-30 RX ORDER — ATOVAQUONE 750 MG/5ML
750 SUSPENSION ORAL
Qty: 0 | Refills: 0 | Status: DISCONTINUED | OUTPATIENT
Start: 2018-07-30 | End: 2018-08-01

## 2018-07-30 RX ADMIN — ATOVAQUONE 750 MILLIGRAM(S): 750 SUSPENSION ORAL at 21:14

## 2018-07-30 RX ADMIN — ATORVASTATIN CALCIUM 10 MILLIGRAM(S): 80 TABLET, FILM COATED ORAL at 21:14

## 2018-07-30 RX ADMIN — Medication 2: at 17:40

## 2018-07-30 RX ADMIN — Medication 40 MILLIEQUIVALENT(S): at 17:54

## 2018-07-30 RX ADMIN — SODIUM CHLORIDE 60 MILLILITER(S): 9 INJECTION INTRAMUSCULAR; INTRAVENOUS; SUBCUTANEOUS at 08:06

## 2018-07-30 RX ADMIN — AZITHROMYCIN 250 MILLIGRAM(S): 500 TABLET, FILM COATED ORAL at 18:55

## 2018-07-30 RX ADMIN — Medication 40 MILLIEQUIVALENT(S): at 21:14

## 2018-07-30 NOTE — CHART NOTE - NSCHARTNOTEFT_GEN_A_CORE
Per hematology start patient on Clindamycin empirically - blood work for babesiosis sent  Awaiting ID and per Dr. Molina  start pt on Clindamycin - 600mg IV started     [id number]

## 2018-07-30 NOTE — CHART NOTE - NSCHARTNOTEFT_GEN_A_CORE
Discussed with Dr. Nesbitt regarding Rx of babesiosis  Plan:  Discontinue Clindamycin           Give Azithromycin 500mg IV x 1 stat and then 250mg IV daily            Mepron 750mg PO two times a day    [id number]

## 2018-07-30 NOTE — PROGRESS NOTE ADULT - ASSESSMENT
palpitation , OLIVARES, sinus tachycardia   due to profound anemia  as per Heme, it appears a hemolytic process is in effect ? babesiosis     agree with echo  fu labs as per heme  consider empiric treatment  no CV objection to proceed to colonoscopy if deemed urgent as per GI

## 2018-07-30 NOTE — PROGRESS NOTE ADULT - ATTENDING COMMENTS
59 yo female with pancytopenia and lft abnormalities noted on pb smear to have rbc inclusion bodies c/w babesiosis.  agree with starting clindamycin and quinine  ID consult asap  add folic acid   monitor cbc with diff daily  cont supportive care  GI apprec, no scope planned at this time  heme f/u at [facility] on dc

## 2018-07-30 NOTE — PROGRESS NOTE ADULT - ASSESSMENT
pt with ? babesia per heme.  no offical smear.  ? id.  Clearly will hold off on colonosopcy, d/w n.p.  possible clears vs po.  will not do endoscopic evaluation until ? of hemolytic anemia resolved.

## 2018-07-30 NOTE — PROGRESS NOTE ADULT - ASSESSMENT
Summary:  57 yo female with PMHx of HTN, HLD, DM initially presented to the ED with complaint of palpitations x 3-4 weeks a/w fatigue, exertional dyspnea in setting of frequent recent hiking and gardening. Peripheral smear suggestive of babesiosis.    Problem 1: Hemolytic Anemia  - Hb stable, 7.0 today, recommend transfusion if < 7.0 or if patient develops symptoms at rest.  - Risk factors for tick-borne diseases present  - Peripheral smear reviewed showing extensive reticulocytosis, few intraerythrocytic ring forms with inclusion bodies, rare schistocyte  - Suspect babesiosis  - F/u babesia serology (Note that IgM titers 1:64 and greater are positive)  - Babesia PCR also sent in the case of possible past infection  - Recommend empiric antibiotic therapy for babesiosis, consider ID evaluation    Josh Franco  PGY-4 Hematology / Oncology  Pager [phone] Summary:  59 yo female with PMHx of HTN, HLD, DM initially presented to the ED with complaint of palpitations x 3-4 weeks a/w fatigue, exertional dyspnea in setting of frequent recent hiking and gardening. Peripheral smear suggestive of babesiosis.    Problem 1: Hemolytic Anemia  - Hb stable, 7.0 today, recommend transfusion if < 7.0 or if patient develops symptoms at rest.  - Risk factors for tick-borne diseases present  - Peripheral smear reviewed showing extensive reticulocytosis, few intraerythrocytic ring forms with inclusion bodies, rare schistocyte  - Suspect babesiosis  - F/u babesia serology (Note that IgM titer dilutions 1:64 and greater are positive)  - Babesia PCR also sent in the case of possible past infection  - Recommend empiric antibiotic therapy for babesiosis, consider ID evaluation    Josh Franco  PGY-4 Hematology / Oncology  Pager [phone]

## 2018-07-30 NOTE — PROGRESS NOTE ADULT - ASSESSMENT
pt w/ weakness fatigue found to be tachycardic and anemic  guauaic neg as per er  anemia w/u  gi eval noted  Hb drop noted  babesiosis suspected by smear  ID eval  start clynda  hold bp meds  TSH normal  echo  orthostatics neg  cards eval noted  tele  dm  fsg riss  hold oral meds  Heme eval noted

## 2018-07-31 ENCOUNTER — TRANSCRIPTION ENCOUNTER (OUTPATIENT)
Age: 58
End: 2018-07-31

## 2018-07-31 LAB
ANION GAP SERPL CALC-SCNC: 7 MMOL/L — SIGNIFICANT CHANGE UP (ref 5–17)
BUN SERPL-MCNC: <4 MG/DL — LOW (ref 7–23)
CALCIUM SERPL-MCNC: 8.6 MG/DL — SIGNIFICANT CHANGE UP (ref 8.4–10.5)
CHLORIDE SERPL-SCNC: 106 MMOL/L — SIGNIFICANT CHANGE UP (ref 96–108)
CO2 SERPL-SCNC: 25 MMOL/L — SIGNIFICANT CHANGE UP (ref 22–31)
CREAT SERPL-MCNC: 0.51 MG/DL — SIGNIFICANT CHANGE UP (ref 0.5–1.3)
CULTURE RESULTS: SIGNIFICANT CHANGE UP
GLUCOSE SERPL-MCNC: 134 MG/DL — HIGH (ref 70–99)
HCT VFR BLD CALC: 23.6 % — LOW (ref 34.5–45)
HGB BLD-MCNC: 7.5 G/DL — LOW (ref 11.5–15.5)
MCHC RBC-ENTMCNC: 31.7 GM/DL — LOW (ref 32–36)
MCHC RBC-ENTMCNC: 31.7 PG — SIGNIFICANT CHANGE UP (ref 27–34)
MCV RBC AUTO: 100 FL — SIGNIFICANT CHANGE UP (ref 80–100)
PLATELET # BLD AUTO: 397 K/UL — SIGNIFICANT CHANGE UP (ref 150–400)
POTASSIUM SERPL-MCNC: 4.4 MMOL/L — SIGNIFICANT CHANGE UP (ref 3.5–5.3)
POTASSIUM SERPL-SCNC: 4.4 MMOL/L — SIGNIFICANT CHANGE UP (ref 3.5–5.3)
RBC # BLD: 2.36 M/UL — LOW (ref 3.8–5.2)
RBC # FLD: 19 % — HIGH (ref 10.3–14.5)
SODIUM SERPL-SCNC: 138 MMOL/L — SIGNIFICANT CHANGE UP (ref 135–145)
SPECIMEN SOURCE: SIGNIFICANT CHANGE UP
WBC # BLD: 4.3 K/UL — SIGNIFICANT CHANGE UP (ref 3.8–10.5)
WBC # FLD AUTO: 4.3 K/UL — SIGNIFICANT CHANGE UP (ref 3.8–10.5)

## 2018-07-31 PROCEDURE — 99232 SBSQ HOSP IP/OBS MODERATE 35: CPT

## 2018-07-31 RX ADMIN — AZITHROMYCIN 250 MILLIGRAM(S): 500 TABLET, FILM COATED ORAL at 10:47

## 2018-07-31 RX ADMIN — ATOVAQUONE 750 MILLIGRAM(S): 750 SUSPENSION ORAL at 06:16

## 2018-07-31 RX ADMIN — ATORVASTATIN CALCIUM 10 MILLIGRAM(S): 80 TABLET, FILM COATED ORAL at 21:48

## 2018-07-31 RX ADMIN — ATOVAQUONE 750 MILLIGRAM(S): 750 SUSPENSION ORAL at 17:28

## 2018-07-31 NOTE — DISCHARGE NOTE ADULT - CARE PLAN
Principal Discharge DX:	Babesiosis  Goal:	Complete resolution  Assessment and plan of treatment:	Started mepron and azithromax to complete 3 weeks   Follow up with Infectious diseases in 5 days to repeat blood work for babesiosis  [facility] will call reach within 1 week to set up follow-up appointment - I you do not hear from the new patient unit within 1 week of discharge please advise her or her daughter to call [phone]  Secondary Diagnosis:	Hemolytic anemia  Assessment and plan of treatment:	Due to babesiosis  [facility] will call reach within 1 week to set up follow-up appointment - I you do not hear from the new patient unit within 1 week of discharge please advise her or her daughter to call [phone]  Secondary Diagnosis:	Essential hypertension  Assessment and plan of treatment:	Take medications for your blood pressure as recommended.  Eat a heart healthy diet that is low in saturated fats and salt, and includes whole grains, fruits, vegetables and lean protein   Exercise regularly (consult with your physician or cardiologist first); maintain a heart healthy weight.   Continue to follow with your primary physician or cardiologist.   Seek medical help for dizziness, Lightheadedness, Blurry vision, Headache, Chest pain, Shortness of breath  Follow up with your medical doctor to establish long term blood pressure treatment goals.  Secondary Diagnosis:	Type 2 diabetes mellitus with other neurologic complication, without long-term current use of insulin  Assessment and plan of treatment:	Follow up with PMD in 1 week  HgA1C this admission - 4.5  Make sure you get your HgA1c checked every three months.  If you take oral diabetes medications, check your blood glucose two times a day.  If you take insulin, check your blood glucose before meals and at bedtime.  It's important not to skip any meals.  Keep a log of your blood glucose results and always take it with you to your doctor appointments.  Keep a list of your current medications including injectables and over the counter medications and bring this medication list with you to all your doctor appointments.  If you have not seen your ophthalmologist this year call for appointment.  Check your feet daily for redness, sores, or openings. Do not self treat. If no improvement in two days call your primary care physician for an appointment.  Low blood sugar (hypoglycemia) is a blood sugar below 70mg/dl. Check your blood sugar if you feel signs/symptoms of hypoglycemia. If your blood sugar is below 70 take 15 grams of carbohydrates (ex 4 oz of apple juice, 3-4 glucose tablets, or 4-6 oz of regular soda) wait 15 minutes and repeat blood sugar to make sure it comes up above 70.  If your blood sugar is above 70 and you are due for a meal, have a meal.  If you are not due for a meal have a snack.  This snack helps keeps your blood sugar at a safe range. Principal Discharge DX:	Babesiosis  Goal:	Complete resolution  Assessment and plan of treatment:	Started mepron and azithromax to complete 2 weeks   Follow up with Infectious diseases - Dr. Nesbitt after completion of antibiotics - follow up cbc and smear.   [facility] will call reach within 1 week to set up follow-up appointment - I you do not hear from the new patient unit within 1 week of discharge please advise her or her daughter to call [phone]  Secondary Diagnosis:	Hemolytic anemia  Assessment and plan of treatment:	Due to babesiosis  [facility] will call reach within 1 week to set up follow-up appointment - I you do not hear from the new patient unit within 1 week of discharge please advise her or her daughter to call [phone]  Secondary Diagnosis:	Essential hypertension  Assessment and plan of treatment:	Take medications for your blood pressure as recommended.  Eat a heart healthy diet that is low in saturated fats and salt, and includes whole grains, fruits, vegetables and lean protein   Exercise regularly (consult with your physician or cardiologist first); maintain a heart healthy weight.   Continue to follow with your primary physician or cardiologist.   Seek medical help for dizziness, Lightheadedness, Blurry vision, Headache, Chest pain, Shortness of breath  Follow up with your medical doctor to establish long term blood pressure treatment goals.  Secondary Diagnosis:	Type 2 diabetes mellitus with other neurologic complication, without long-term current use of insulin  Assessment and plan of treatment:	Follow up with PMD in 1 week  HgA1C this admission - 4.5  Make sure you get your HgA1c checked every three months.  If you take oral diabetes medications, check your blood glucose two times a day.  If you take insulin, check your blood glucose before meals and at bedtime.  It's important not to skip any meals.  Keep a log of your blood glucose results and always take it with you to your doctor appointments.  Keep a list of your current medications including injectables and over the counter medications and bring this medication list with you to all your doctor appointments.  If you have not seen your ophthalmologist this year call for appointment.  Check your feet daily for redness, sores, or openings. Do not self treat. If no improvement in two days call your primary care physician for an appointment.  Low blood sugar (hypoglycemia) is a blood sugar below 70mg/dl. Check your blood sugar if you feel signs/symptoms of hypoglycemia. If your blood sugar is below 70 take 15 grams of carbohydrates (ex 4 oz of apple juice, 3-4 glucose tablets, or 4-6 oz of regular soda) wait 15 minutes and repeat blood sugar to make sure it comes up above 70.  If your blood sugar is above 70 and you are due for a meal, have a meal.  If you are not due for a meal have a snack.  This snack helps keeps your blood sugar at a safe range. Principal Discharge DX:	Babesiosis  Goal:	Complete resolution  Assessment and plan of treatment:	Started mepron and azithromax to complete 2 weeks   Follow up with Infectious diseases - Dr. Nesbitt after completion of antibiotics - follow up cbc and smear.   [facility] will call reach within 1 week to set up follow-up appointment - I you do not hear from the new patient unit within 1 week of discharge please advise her or her daughter to call [phone]  Protect self and wear full light colored clothing when going outside  Secondary Diagnosis:	Hemolytic anemia  Assessment and plan of treatment:	Due to babesiosis  [facility] will call reach within 1 week to set up follow-up appointment - I you do not hear from the new patient unit within 1 week of discharge please advise her or her daughter to call [phone]  Secondary Diagnosis:	Essential hypertension  Assessment and plan of treatment:	Take medications for your blood pressure as recommended.  Eat a heart healthy diet that is low in saturated fats and salt, and includes whole grains, fruits, vegetables and lean protein   Exercise regularly (consult with your physician or cardiologist first); maintain a heart healthy weight.   Continue to follow with your primary physician or cardiologist.   Seek medical help for dizziness, Lightheadedness, Blurry vision, Headache, Chest pain, Shortness of breath  Follow up with your medical doctor to establish long term blood pressure treatment goals.  Secondary Diagnosis:	Type 2 diabetes mellitus with other neurologic complication, without long-term current use of insulin  Assessment and plan of treatment:	Follow up with PMD in 1 week  HgA1C this admission - 4.5  Make sure you get your HgA1c checked every three months.  If you take oral diabetes medications, check your blood glucose two times a day.  If you take insulin, check your blood glucose before meals and at bedtime.  It's important not to skip any meals.  Keep a log of your blood glucose results and always take it with you to your doctor appointments.  Keep a list of your current medications including injectables and over the counter medications and bring this medication list with you to all your doctor appointments.  If you have not seen your ophthalmologist this year call for appointment.  Check your feet daily for redness, sores, or openings. Do not self treat. If no improvement in two days call your primary care physician for an appointment.  Low blood sugar (hypoglycemia) is a blood sugar below 70mg/dl. Check your blood sugar if you feel signs/symptoms of hypoglycemia. If your blood sugar is below 70 take 15 grams of carbohydrates (ex 4 oz of apple juice, 3-4 glucose tablets, or 4-6 oz of regular soda) wait 15 minutes and repeat blood sugar to make sure it comes up above 70.  If your blood sugar is above 70 and you are due for a meal, have a meal.  If you are not due for a meal have a snack.  This snack helps keeps your blood sugar at a safe range.

## 2018-07-31 NOTE — CONSULT NOTE ADULT - ASSESSMENT
58 yr old admitted with hemolytic anemia.  Was hiking on [place] about a month ago.  Denies fever and chills .Blood smear times one negative by lab, but hematology reported seeing possible blood parasites.    start mepron and azithromax  await babesia pcr and serology  additional blood smears.

## 2018-07-31 NOTE — DISCHARGE NOTE ADULT - ADDITIONAL INSTRUCTIONS
Follow up with Infectious diseases in 5 days to repeat blood work for babesiosis  [facility] will call reach within 1 week to set up follow-up appointment - I you do not hear from the new patient unit within 1 week of discharge please advise her or her daughter to call [phone]  Follow up with PMD in 1 week Follow up with Infectious diseases - Dr. Nesbitt after completion of antibiotics - follow up cbc and smear.   [facility] will call reach within 1 week to set up follow-up appointment - I you do not hear from the new patient unit within 1 week of discharge please advise her or her daughter to call [phone]  Follow up with PMD in 1 week  Follow up with PMD in 1 week

## 2018-07-31 NOTE — CHART NOTE - NSCHARTNOTEFT_GEN_A_CORE
Hematology Chart note:    - Chart Reviewed  - Babesia species found on Giemsa stain  - Patient now undergoing treatment for babesiosis, PCR and serologies sent  - Will be followed by ID as outpatient  - Patient should follow-up with Hematology as an outpatient to follow resolution of anemia Hematology Chart note:    - Chart Reviewed  - Babesia species found on Giemsa stain  - Patient now undergoing treatment for babesiosis, PCR and serologies sent  - Will be followed by ID as outpatient  - Patient should follow-up with Hematology as an outpatient to follow resolution of anemia  - I will notify the new patient unit at [facility] to reach out to Mrs Goodman within 1 week to set up follow-up appointment.   - If she does not hear from the new patient unit within 1 week of discharge please advise her or her daughter to call [phone] to ensure an appointment is made.  - Will sign off, reconsult as needed    Josh Franco  PGY-4 Hematology / Oncology  Pager [phone].

## 2018-07-31 NOTE — CONSULT NOTE ADULT - SUBJECTIVE AND OBJECTIVE BOX
Patient is a 58y old  Female who presents with a chief complaint of   HPI:  59yo female pt with PMHx of HTN, HLD, DM c/o palpitation, fatigue and OLIVARES for 2 weeks  . Pt stated she felt racing heart beats 2 weeks ago and they've worsen gradually with fatigue/ OLIVARES since one week  . Pt's evaluated by urgent care  today and sent to ED.   Denies CP or syncopal episode. Denies headache, dizziness or visual changes.   Denies neck or back pain. Denies sensory changes or weakness to extremities. Denies fever, chills (28 Jul 2018 19:59)      PAST MEDICAL & SURGICAL HISTORY:  Essential hypertension  Type 2 diabetes mellitus with other neurologic complication, without long-term current use of insulin      Social history:    FAMILY HISTORY:    REVIEW OF SYSTEMS  General:	Denies any malaise fatigue or chills. Fevers absent    Skin:No rash  	  Ophthalmologic:Denies any visual complaints,discharge redness or photophobia  	  ENMT:No nasal discharge,headache,sinus congestion or throat pain.No dental complaints    Respiratory and Thorax:No cough,sputum or chest pain.Denies shortness of breath  	  Cardiovascular:	No chest pain,palpitaions or dizziness    Gastrointestinal:	NO nausea,abdominal pain or diarrhea.    Genitourinary:	No dysuria,frequency. No flank pain    Musculoskeletal:	No joint swelling or pain.No weakness    Neurological:No confusion,diziness.No extremity weakness.No bladder or bowel incontinence	    Psychiatric:No delusions or hallucinations	    Hematology/Lymphatics:	No LN swelling.No gum bleeding     Endocrine:	No recent weight gain or loss.No abnormal heat/cold intolerance    Allergic/Immunologic:	No hives or rash   Allergies    No Known Allergies    Intolerances        Antimicrobials:    atovaquone Suspension 750 milliGRAM(s) Oral two times a day  azithromycin  IVPB 250 milliGRAM(s) IV Intermittent every 24 hours        Vital Signs Last 24 Hrs  T(C): 36.9 (31 Jul 2018 04:15), Max: 37 (30 Jul 2018 12:11)  T(F): 98.4 (31 Jul 2018 04:15), Max: 98.6 (30 Jul 2018 12:11)  HR: 88 (31 Jul 2018 04:15) (88 - 102)  BP: 105/72 (31 Jul 2018 04:15) (105/72 - 109/71)  BP(mean): --  RR: 18 (31 Jul 2018 04:15) (18 - 18)  SpO2: 99% (31 Jul 2018 04:15) (98% - 99%)    PHYSICAL EXAM:Pleasant patient in no acute distress.      Constitutional:Comfortable.Awake and alert  No cachexia     Eyes:PERRL EOMI.NO discharge or conjunctival injection    ENMT:No sinus tenderness.No thrush.No pharyngeal exudate or erythema.Fair dental hygiene    Neck:Supple,No LN,no JVD      Respiratory:Good air entry bilaterally,CTA    Cardiovascular:S1 S2 wnl, No murmurs,rub or gallops    Gastrointestinal:Soft BS(+) no tenderness no masses ,No rebound or guarding    Genitourinary:No CVA tendereness     Rectal:    Extremities:No cyanosis,clubbing or edema.    Vascular:peripheral pulses felt    Neurological:AAO X 3,No grossly focal deficits    Skin:No rash     Lymph Nodes:No palpable LNs    Musculoskeletal:No joint swelling or LOM    Psychiatric:Affect normal.                                7.5    4.3   )-----------( 397      ( 31 Jul 2018 05:27 )             23.6         07-31    138  |  106  |  <4<L>  ----------------------------<  134<H>  4.4   |  25  |  0.51    Ca    8.6      31 Jul 2018 05:27    TPro  x   /  Alb  x   /  TBili  1.1  /  DBili  0.3<H>  /  AST  x   /  ALT  x   /  AlkPhos  x   07-29      RECENT CULTURES:  07-30 @ 14:25  .Blood peripheral blood  --  --  --    No Blood Parasites observed by giemsa stain  On thin smear Thick smear to follow.  --      MICROBIOLOGY:  Culture Results:   No Blood Parasites observed by giemsa stain  On thin smear Thick smear to follow. (07-30 @ 14:25)          Radiology:      Assessment:        Recommendations and Plan:    Pager [phone]  After 5 pm/weekends or if no response :[phone]

## 2018-07-31 NOTE — PROVIDER CONTACT NOTE (CRITICAL VALUE NOTIFICATION) - TEST AND RESULT REPORTED:
Blood parasite drawn on 30th July -final results babesia species giemsa stain parasitemia less than 1 %

## 2018-07-31 NOTE — PROGRESS NOTE ADULT - ASSESSMENT
palpitation , OLIVARES, sinus tachycardia   due to profound anemia  hemolytic anemia  echo shows normal LV function  treatment as per heme and ID for ?babesiosis

## 2018-07-31 NOTE — DISCHARGE NOTE ADULT - HOSPITAL COURSE
59 yo female with PMHx of HTN, HLD, DM initially presented to the ED with complaint of fast heart rate and palpitations for a 3-4 weeks with elevated heart rate, fatigue and dyspnea on exertion that has been progressively worsening. She came into the ED after going to urgent care with a HR of 130.  Found to have hemolytic anemia.  Was hiking on [place] about a month ago.  Denies fever and chills.   Hematology and infectious diseases consulted. Blood smear times one positive. Babesia pcr and serology pending  Started mepron and azithromax to complete 3 weeks   Follow up with Infectious diseases in 5 days to repeat blood work for babesiosis  [facility] will call reach within 1 week to set up follow-up appointment - I you do not hear from the new patient unit within 1 week of discharge please advise her or her daughter to call [phone] 59 yo female with PMHx of HTN, HLD, DM initially presented to the ED with complaint of fast heart rate and palpitations for a 3-4 weeks with elevated heart rate, fatigue and dyspnea on exertion that has been progressively worsening. She came into the ED after going to urgent care with a HR of 130.  Found to have hemolytic anemia.  Was hiking on [place] about a month ago.  Denies fever and chills.   Hematology and infectious diseases consulted. Blood smear times one positive. Babesia pcr and serology pending  Started mepron and azithromax to complete 3 weeks   Follow up with Infectious diseases - Dr. Nesbitt after completion of antibiotics - follow up cbc and smear for babesiosis  [facility] will call reach within 1 week to set up follow-up appointment - I you do not hear from the new patient unit within 1 week of discharge please advise her or her daughter to call [phone]

## 2018-07-31 NOTE — PROGRESS NOTE ADULT - ASSESSMENT
pt w/ weakness fatigue found to be tachycardic and anemic  guauaic neg as per er  anemia w/u  gi eval noted  Hb drop noted  babesiosis comfirmed  ID eval noted  abx per ID  hold bp meds  TSH normal  echo  orthostatics neg  cards eval noted  tele  dm  fsg riss  hold oral meds  Heme eval noted  d/c planning for tomorrow if no objection from Heme and ID

## 2018-07-31 NOTE — DISCHARGE NOTE ADULT - MEDICATION SUMMARY - MEDICATIONS TO TAKE
I will START or STAY ON the medications listed below when I get home from the hospital:    metFORMIN 500 mg oral tablet, extended release  -- 1 tab(s) by mouth once a day (in the evening)  -- Indication: For Blood sugar control    Jardiance 25 mg oral tablet  -- 1 tab(s) by mouth once a day (in the evening)  -- Indication: For Blood sugar control    atorvastatin 10 mg oral tablet  -- 1 tab(s) by mouth once a day (in the evening)  -- Indication: For Cholesterol control    Hyzaar 50 mg-12.5 mg oral tablet  -- 1 tab(s) by mouth once a day (in the evening)  -- Indication: For Blood pressure control    azithromycin 250 mg oral tablet  -- orally once a day x 11 more days  -- Indication: For Babesiosis    Mepron 750 mg/5 mL oral suspension  -- 5 milliliter(s) by mouth 2 times a day x 12 days  -- Indication: For Babesiosis

## 2018-07-31 NOTE — DISCHARGE NOTE ADULT - CARE PROVIDER_API CALL
Roger Nesbitt), Infectious Disease; Internal Medicine  [address] [place] [address]  Phone: [phone]  Fax: [phone]    [facility],   [phone]  Phone: (   )    -  Fax: (   )    -

## 2018-07-31 NOTE — DISCHARGE NOTE ADULT - PROVIDER TOKENS
TOKEN:'[id number]',FREE:[LAST:[[facility]],PHONE:[(   )    -],FAX:[(   )    -],ADDRESS:[[phone]]]

## 2018-07-31 NOTE — DISCHARGE NOTE ADULT - SECONDARY DIAGNOSIS.
Hemolytic anemia Essential hypertension Type 2 diabetes mellitus with other neurologic complication, without long-term current use of insulin

## 2018-07-31 NOTE — DISCHARGE NOTE ADULT - PLAN OF CARE
Complete resolution Started mepron and azithromax to complete 3 weeks   Follow up with Infectious diseases in 5 days to repeat blood work for babesiosis  [facility] will call reach within 1 week to set up follow-up appointment - I you do not hear from the new patient unit within 1 week of discharge please advise her or her daughter to call [phone] Due to babesiosis  [facility] will call reach within 1 week to set up follow-up appointment - I you do not hear from the new patient unit within 1 week of discharge please advise her or her daughter to call [phone] Take medications for your blood pressure as recommended.  Eat a heart healthy diet that is low in saturated fats and salt, and includes whole grains, fruits, vegetables and lean protein   Exercise regularly (consult with your physician or cardiologist first); maintain a heart healthy weight.   Continue to follow with your primary physician or cardiologist.   Seek medical help for dizziness, Lightheadedness, Blurry vision, Headache, Chest pain, Shortness of breath  Follow up with your medical doctor to establish long term blood pressure treatment goals. Follow up with PMD in 1 week  HgA1C this admission - 4.5  Make sure you get your HgA1c checked every three months.  If you take oral diabetes medications, check your blood glucose two times a day.  If you take insulin, check your blood glucose before meals and at bedtime.  It's important not to skip any meals.  Keep a log of your blood glucose results and always take it with you to your doctor appointments.  Keep a list of your current medications including injectables and over the counter medications and bring this medication list with you to all your doctor appointments.  If you have not seen your ophthalmologist this year call for appointment.  Check your feet daily for redness, sores, or openings. Do not self treat. If no improvement in two days call your primary care physician for an appointment.  Low blood sugar (hypoglycemia) is a blood sugar below 70mg/dl. Check your blood sugar if you feel signs/symptoms of hypoglycemia. If your blood sugar is below 70 take 15 grams of carbohydrates (ex 4 oz of apple juice, 3-4 glucose tablets, or 4-6 oz of regular soda) wait 15 minutes and repeat blood sugar to make sure it comes up above 70.  If your blood sugar is above 70 and you are due for a meal, have a meal.  If you are not due for a meal have a snack.  This snack helps keeps your blood sugar at a safe range. Started mepron and azithromax to complete 2 weeks   Follow up with Infectious diseases - Dr. Nesbitt after completion of antibiotics - follow up cbc and smear.   [facility] will call reach within 1 week to set up follow-up appointment - I you do not hear from the new patient unit within 1 week of discharge please advise her or her daughter to call [phone] Started mepron and azithromax to complete 2 weeks   Follow up with Infectious diseases - Dr. Nesbitt after completion of antibiotics - follow up cbc and smear.   [facility] will call reach within 1 week to set up follow-up appointment - I you do not hear from the new patient unit within 1 week of discharge please advise her or her daughter to call [phone]  Protect self and wear full light colored clothing when going outside

## 2018-07-31 NOTE — PROGRESS NOTE ADULT - ASSESSMENT
Awaiting PCR for babesia, colonoscopy on hold pending results, if has active infection then would advance diet and hold off on colonoscopy this admit as she is guaiac neg  if no active infection and cleared for colonoscopy then would continue clears and  reschedule

## 2018-07-31 NOTE — DISCHARGE NOTE ADULT - PATIENT PORTAL LINK FT
You can access the [facility], offered by [facility], by registering with the following website: [email or web address]

## 2018-08-01 VITALS
DIASTOLIC BLOOD PRESSURE: 64 MMHG | OXYGEN SATURATION: 99 % | RESPIRATION RATE: 18 BRPM | TEMPERATURE: 98 F | HEART RATE: 92 BPM | SYSTOLIC BLOOD PRESSURE: 100 MMHG

## 2018-08-01 LAB
ANION GAP SERPL CALC-SCNC: 11 MMOL/L — SIGNIFICANT CHANGE UP (ref 5–17)
B MICROTI DNA BLD QL NAA+PROBE: POSITIVE
BABESIA DNA SPEC QL NAA+PROBE: NEGATIVE — SIGNIFICANT CHANGE UP
BABESIA DNA SPEC QL NAA+PROBE: NEGATIVE — SIGNIFICANT CHANGE UP
BUN SERPL-MCNC: 10 MG/DL — SIGNIFICANT CHANGE UP (ref 7–23)
CALCIUM SERPL-MCNC: 8.5 MG/DL — SIGNIFICANT CHANGE UP (ref 8.4–10.5)
CHLORIDE SERPL-SCNC: 102 MMOL/L — SIGNIFICANT CHANGE UP (ref 96–108)
CO2 SERPL-SCNC: 24 MMOL/L — SIGNIFICANT CHANGE UP (ref 22–31)
CREAT SERPL-MCNC: 0.48 MG/DL — LOW (ref 0.5–1.3)
CULTURE RESULTS: SIGNIFICANT CHANGE UP
GLUCOSE SERPL-MCNC: 138 MG/DL — HIGH (ref 70–99)
HCT VFR BLD CALC: 26.5 % — LOW (ref 34.5–45)
HGB BLD-MCNC: 8.2 G/DL — LOW (ref 11.5–15.5)
MCHC RBC-ENTMCNC: 30.9 GM/DL — LOW (ref 32–36)
MCHC RBC-ENTMCNC: 31.1 PG — SIGNIFICANT CHANGE UP (ref 27–34)
MCV RBC AUTO: 101 FL — HIGH (ref 80–100)
PLATELET # BLD AUTO: 443 K/UL — HIGH (ref 150–400)
POTASSIUM SERPL-MCNC: 3.9 MMOL/L — SIGNIFICANT CHANGE UP (ref 3.5–5.3)
POTASSIUM SERPL-SCNC: 3.9 MMOL/L — SIGNIFICANT CHANGE UP (ref 3.5–5.3)
RBC # BLD: 2.63 M/UL — LOW (ref 3.8–5.2)
RBC # FLD: 18.7 % — HIGH (ref 10.3–14.5)
SODIUM SERPL-SCNC: 137 MMOL/L — SIGNIFICANT CHANGE UP (ref 135–145)
SPECIMEN SOURCE: SIGNIFICANT CHANGE UP
WBC # BLD: 4.5 K/UL — SIGNIFICANT CHANGE UP (ref 3.8–10.5)
WBC # FLD AUTO: 4.5 K/UL — SIGNIFICANT CHANGE UP (ref 3.8–10.5)

## 2018-08-01 PROCEDURE — 87798 DETECT AGENT NOS DNA AMP: CPT

## 2018-08-01 PROCEDURE — 83880 ASSAY OF NATRIURETIC PEPTIDE: CPT

## 2018-08-01 PROCEDURE — 83036 HEMOGLOBIN GLYCOSYLATED A1C: CPT

## 2018-08-01 PROCEDURE — 84484 ASSAY OF TROPONIN QUANT: CPT

## 2018-08-01 PROCEDURE — 82962 GLUCOSE BLOOD TEST: CPT

## 2018-08-01 PROCEDURE — 82248 BILIRUBIN DIRECT: CPT

## 2018-08-01 PROCEDURE — 83605 ASSAY OF LACTIC ACID: CPT

## 2018-08-01 PROCEDURE — 82247 BILIRUBIN TOTAL: CPT

## 2018-08-01 PROCEDURE — 85730 THROMBOPLASTIN TIME PARTIAL: CPT

## 2018-08-01 PROCEDURE — 82435 ASSAY OF BLOOD CHLORIDE: CPT

## 2018-08-01 PROCEDURE — 86901 BLOOD TYPING SEROLOGIC RH(D): CPT

## 2018-08-01 PROCEDURE — 71275 CT ANGIOGRAPHY CHEST: CPT

## 2018-08-01 PROCEDURE — 84295 ASSAY OF SERUM SODIUM: CPT

## 2018-08-01 PROCEDURE — 93005 ELECTROCARDIOGRAM TRACING: CPT

## 2018-08-01 PROCEDURE — 83615 LACTATE (LD) (LDH) ENZYME: CPT

## 2018-08-01 PROCEDURE — 82607 VITAMIN B-12: CPT

## 2018-08-01 PROCEDURE — 84466 ASSAY OF TRANSFERRIN: CPT

## 2018-08-01 PROCEDURE — 85610 PROTHROMBIN TIME: CPT

## 2018-08-01 PROCEDURE — 82803 BLOOD GASES ANY COMBINATION: CPT

## 2018-08-01 PROCEDURE — 85027 COMPLETE CBC AUTOMATED: CPT

## 2018-08-01 PROCEDURE — 87207 SMEAR SPECIAL STAIN: CPT

## 2018-08-01 PROCEDURE — 84443 ASSAY THYROID STIM HORMONE: CPT

## 2018-08-01 PROCEDURE — 86900 BLOOD TYPING SEROLOGIC ABO: CPT

## 2018-08-01 PROCEDURE — 86880 COOMBS TEST DIRECT: CPT

## 2018-08-01 PROCEDURE — 85014 HEMATOCRIT: CPT

## 2018-08-01 PROCEDURE — 81001 URINALYSIS AUTO W/SCOPE: CPT

## 2018-08-01 PROCEDURE — 82947 ASSAY GLUCOSE BLOOD QUANT: CPT

## 2018-08-01 PROCEDURE — 93306 TTE W/DOPPLER COMPLETE: CPT

## 2018-08-01 PROCEDURE — 82728 ASSAY OF FERRITIN: CPT

## 2018-08-01 PROCEDURE — 80053 COMPREHEN METABOLIC PANEL: CPT

## 2018-08-01 PROCEDURE — 85045 AUTOMATED RETICULOCYTE COUNT: CPT

## 2018-08-01 PROCEDURE — 83550 IRON BINDING TEST: CPT

## 2018-08-01 PROCEDURE — 70450 CT HEAD/BRAIN W/O DYE: CPT

## 2018-08-01 PROCEDURE — 99285 EMERGENCY DEPT VISIT HI MDM: CPT | Mod: 25

## 2018-08-01 PROCEDURE — 86850 RBC ANTIBODY SCREEN: CPT

## 2018-08-01 PROCEDURE — 82746 ASSAY OF FOLIC ACID SERUM: CPT

## 2018-08-01 PROCEDURE — 99232 SBSQ HOSP IP/OBS MODERATE 35: CPT

## 2018-08-01 PROCEDURE — 82330 ASSAY OF CALCIUM: CPT

## 2018-08-01 PROCEDURE — 84132 ASSAY OF SERUM POTASSIUM: CPT

## 2018-08-01 PROCEDURE — 80048 BASIC METABOLIC PNL TOTAL CA: CPT

## 2018-08-01 PROCEDURE — 71045 X-RAY EXAM CHEST 1 VIEW: CPT

## 2018-08-01 PROCEDURE — 83010 ASSAY OF HAPTOGLOBIN QUANT: CPT

## 2018-08-01 PROCEDURE — 82272 OCCULT BLD FECES 1-3 TESTS: CPT

## 2018-08-01 PROCEDURE — 86753 PROTOZOA ANTIBODY NOS: CPT

## 2018-08-01 RX ORDER — LOSARTAN/HYDROCHLOROTHIAZIDE 100MG-25MG
1 TABLET ORAL
Qty: 0 | Refills: 0 | COMMUNITY

## 2018-08-01 RX ORDER — ATOVAQUONE 750 MG/5ML
5 SUSPENSION ORAL
Qty: 120 | Refills: 0 | OUTPATIENT
Start: 2018-08-01 | End: 2018-08-12

## 2018-08-01 RX ORDER — EMPAGLIFLOZIN 10 MG/1
1 TABLET, FILM COATED ORAL
Qty: 0 | Refills: 0 | COMMUNITY

## 2018-08-01 RX ORDER — ATORVASTATIN CALCIUM 80 MG/1
1 TABLET, FILM COATED ORAL
Qty: 0 | Refills: 0 | COMMUNITY

## 2018-08-01 RX ORDER — AZITHROMYCIN 500 MG/1
1 TABLET, FILM COATED ORAL
Qty: 11 | Refills: 0 | OUTPATIENT
Start: 2018-08-01 | End: 2018-08-11

## 2018-08-01 RX ORDER — METFORMIN HYDROCHLORIDE 850 MG/1
1 TABLET ORAL
Qty: 0 | Refills: 0 | COMMUNITY

## 2018-08-01 RX ADMIN — AZITHROMYCIN 250 MILLIGRAM(S): 500 TABLET, FILM COATED ORAL at 10:24

## 2018-08-01 RX ADMIN — ATOVAQUONE 750 MILLIGRAM(S): 750 SUSPENSION ORAL at 05:38

## 2018-08-01 RX ADMIN — Medication 2: at 12:23

## 2018-08-01 NOTE — PROVIDER CONTACT NOTE (CRITICAL VALUE NOTIFICATION) - SITUATION
Blood parasite drawn on 30th July -final results babesia species giemsa stain parasitemia less than 1 %
Blood parasites final report is babesia species parasitemia is less than 1 %
Hemoglobin level is 6.8
Hemoglobin level is 7.0
pts hemoglobin-6.9 test repeated

## 2018-08-01 NOTE — PROGRESS NOTE ADULT - ASSESSMENT
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

## 2018-08-01 NOTE — PROGRESS NOTE ADULT - PROVIDER SPECIALTY LIST ADULT
Cardiology
Cardiology
Gastroenterology
Infectious Disease
Internal Medicine
Gastroenterology
Heme/Onc
Internal Medicine

## 2018-08-01 NOTE — PROVIDER CONTACT NOTE (CRITICAL VALUE NOTIFICATION) - ASSESSMENT
Pt axox3,vs, no sob or vomiting noted, no palpitation noted.no chest pain or fever noted.
Pt axox3, vs, no sob or vomiting noted. Pt is tachycardic while ambulating .No chest pain noted.
Pt axox4, vs, no sob, palpitation or chest pain noted.
Pt axox4, vss, no sob or vomiting noted .Pt on antibiotic therapy,
pt aysmtomatic,no active bleeding noted,

## 2018-08-01 NOTE — PROGRESS NOTE ADULT - ASSESSMENT
babesia confirmed on repeat smear  feels better   no symptoms    discussedwith daughter in detail    discharge on current meds to complete 14 days of therapy and she will see me in the office after it is completed for follow up cbc and smear.

## 2018-08-01 NOTE — CHART NOTE - NSCHARTNOTEFT_GEN_A_CORE
Request from  to facilitate patient discharge. Medication reconciliation reviewed, revised, and resolved with Dr. Molina who had medically cleared patient for discharge with follow-up as advised. Please refer to discharge note for detailed hospital course. Patient is currently stable for discharge to home at this time.  Medicaid approved today and pt filled medication today  Contact # [id number]

## 2018-08-01 NOTE — PROVIDER CONTACT NOTE (CRITICAL VALUE NOTIFICATION) - BACKGROUND
Dx:  Acute Anemia - Hemolytic            Babesiosis - Azithromycin and mepron            HTN, HLD, DM
Dx:  Acute Anemia - Hemolytic            Babesiosis
Dx:  Acute Anemia - Hemolytic            Babesiosis
Dx:  Acute Anemia - Hemolytic            Babesiosis - Azithromycin and mepron            HTN, HLD, DM
pts adm with palpitation,hx htn,dm,

## 2018-08-01 NOTE — PROGRESS NOTE ADULT - SUBJECTIVE AND OBJECTIVE BOX
58y Female undergoing workup for hemolytic anemia.    Interval Hx:  Patient feels somewhat better today, she still has occasional palpitations with exertional dyspnea. She completed bowel prep for tentatively planned colonoscopy which is currently on hold due to observed hemolytic process with lower suspicion for concomitant acute blood loss anemia.    Vital Signs Last 24 Hrs  T(C): 37 (30 Jul 2018 12:11), Max: 37 (29 Jul 2018 20:35)  T(F): 98.6 (30 Jul 2018 12:11), Max: 98.6 (29 Jul 2018 20:35)  HR: 99 (30 Jul 2018 12:11) (99 - 112)  BP: 109/71 (30 Jul 2018 12:11) (109/71 - 129/78)  BP(mean): --  RR: 18 (30 Jul 2018 12:11) (17 - 18)  SpO2: 99% (30 Jul 2018 12:11) (98% - 99%)    07-29-18 @ 07:01  -  07-30-18 @ 07:00  --------------------------------------------------------  IN: 1200 mL / OUT: 0 mL / NET: 1200 mL    PHYSICAL EXAM:  Constitutional: well developed, well nourished, in no acute distress  Eyes: PERRL, EOMI, no conjunctival erythema, anicteric.   Pulmonary: Clear to auscultation bilaterally, no dullness, no wheezing, rales or rhonchi.   Cardiac: Tachycardic to ~100, normal S1S2, no murmurs   Abdomen: Normoactive bowel sounds, soft and nontender, no hepatosplenomegaly or masses appreciated.  Lymphatic: No peripheral adenopathy or lymphedema appreciated.   Skin: normal appearance, dry and warm    LABS:  07-30    138  |  103  |  5<L>  ----------------------------<  119<H>  3.3<L>   |  24  |  0.45<L>    Ca    8.1<L>      30 Jul 2018 05:47    TPro  x   /  Alb  x   /  TBili  1.1  /  DBili  0.3<H>  /  AST  x   /  ALT  x   /  AlkPhos  x   07-29                          7.0    4.2   )-----------( 349      ( 30 Jul 2018 10:17 )             22.1     LIVER FUNCTIONS - ( 28 Jul 2018 14:00 )  Alb: 3.6 g/dL / Pro: 7.8 g/dL / ALK PHOS: 145 U/L / ALT: 18 U/L / AST: 28 U/L / GGT: x         PT/INR - ( 28 Jul 2018 14:00 )   PT: 11.4 sec;   INR: 1.04 ratio    PTT - ( 28 Jul 2018 14:00 )  PTT:31.4 sec
LEWIS COSME:[id number],   58yFemale followed for:  No Known Allergies    PAST MEDICAL & SURGICAL HISTORY:  Essential hypertension  Type 2 diabetes mellitus with other neurologic complication, without long-term current use of insulin    FAMILY HISTORY:    MEDICATIONS  (STANDING):  atorvastatin 10 milliGRAM(s) Oral at bedtime  dextrose 5%. 1000 milliLiter(s) (50 mL/Hr) IV Continuous <Continuous>  dextrose 50% Injectable 12.5 Gram(s) IV Push once  dextrose 50% Injectable 25 Gram(s) IV Push once  dextrose 50% Injectable 25 Gram(s) IV Push once  insulin lispro (HumaLOG) corrective regimen sliding scale   SubCutaneous three times a day before meals  sodium chloride 0.9%. 1000 milliLiter(s) (60 mL/Hr) IV Continuous <Continuous>    MEDICATIONS  (PRN):  dextrose 40% Gel 15 Gram(s) Oral once PRN Blood Glucose LESS THAN 70 milliGRAM(s)/deciliter  glucagon  Injectable 1 milliGRAM(s) IntraMuscular once PRN Glucose LESS THAN 70 milligrams/deciliter      Vital Signs Last 24 Hrs  T(C): 36.7 (29 Jul 2018 04:21), Max: 37.1 (28 Jul 2018 18:30)  T(F): 98.1 (29 Jul 2018 04:21), Max: 98.7 (28 Jul 2018 18:30)  HR: 97 (29 Jul 2018 04:21) (97 - 138)  BP: 117/73 (29 Jul 2018 04:21) (114/76 - 136/80)  BP(mean): --  RR: 18 (29 Jul 2018 04:21) (18 - 20)  SpO2: 98% (29 Jul 2018 04:21) (97% - 100%)  nc/at  s1s2  cta  soft, nt, nd no guarding or rebound  no c/c/e    CBC Full  -  ( 29 Jul 2018 08:35 )  WBC Count : 4.00 K/uL  Hemoglobin : 6.9 g/dL  Hematocrit : 22.6 %  Platelet Count - Automated : 368 K/uL  Mean Cell Volume : 102.3 fl  Mean Cell Hemoglobin : 31.2 pg  Mean Cell Hemoglobin Concentration : 30.5 gm/dL  Auto Neutrophil # : x  Auto Lymphocyte # : x  Auto Monocyte # : x  Auto Eosinophil # : x  Auto Basophil # : x  Auto Neutrophil % : x  Auto Lymphocyte % : x  Auto Monocyte % : x  Auto Eosinophil % : x  Auto Basophil % : x    07-29    136  |  103  |  11  ----------------------------<  127<H>  3.6   |  22  |  0.50    Ca    7.8<L>      29 Jul 2018 05:56    TPro  7.8  /  Alb  3.6  /  TBili  1.3<H>  /  DBili  x   /  AST  28  /  ALT  18  /  AlkPhos  145<H>  07-28    PT/INR - ( 28 Jul 2018 14:00 )   PT: 11.4 sec;   INR: 1.04 ratio         PTT - ( 28 Jul 2018 14:00 )  PTT:31.4 sec
CHIEF COMPLAINT:Patient is a 58y old  Female who presents with a chief complaint of   	  Interval history:      Allergies:  No Known Allergies      PAST MEDICAL & SURGICAL HISTORY:  Essential hypertension  Type 2 diabetes mellitus with other neurologic complication, without long-term current use of insulin      FAMILY HISTORY:      REVIEW OF SYSTEMS:  CONSTITUTIONAL: No fever, weight loss, + fatigue  EYES: No eye pain, visual disturbances, or discharge  NECK: No pain or stiffness  RESPIRATORY: No cough or wheezing, no shortness of breath  CARDIOVASCULAR: No chest pain, + palpitations, + dizziness, no leg swelling  GASTROINTESTINAL: No abdominal or epigastric pain. No nausea, vomiting, diarrhea or constipation  GENITOURINARY: No dysuria, urinary frequency or urgency, no hematuria  NEUROLOGICAL: No headaches, memory loss, loss of strength, numbness, or tremors  SKIN: No itching, burning, rashes, or lesions   MUSCULOSKELETAL: No joint pain or swelling; No muscle, back, or extremity pain    Medications:  MEDICATIONS  (STANDING):  atorvastatin 10 milliGRAM(s) Oral at bedtime  dextrose 5%. 1000 milliLiter(s) (50 mL/Hr) IV Continuous <Continuous>  dextrose 50% Injectable 12.5 Gram(s) IV Push once  dextrose 50% Injectable 25 Gram(s) IV Push once  dextrose 50% Injectable 25 Gram(s) IV Push once  insulin lispro (HumaLOG) corrective regimen sliding scale   SubCutaneous three times a day before meals  polyethylene glycol/electrolyte Solution. 4000 milliLiter(s) Oral once  sodium chloride 0.9%. 1000 milliLiter(s) (60 mL/Hr) IV Continuous <Continuous>    MEDICATIONS  (PRN):  dextrose 40% Gel 15 Gram(s) Oral once PRN Blood Glucose LESS THAN 70 milliGRAM(s)/deciliter  glucagon  Injectable 1 milliGRAM(s) IntraMuscular once PRN Glucose LESS THAN 70 milligrams/deciliter    	    PHYSICAL EXAM:  T(C): 36.7 (07-29-18 @ 04:21), Max: 37.1 (07-28-18 @ 18:30)  HR: 97 (07-29-18 @ 04:21) (97 - 138)  BP: 117/73 (07-29-18 @ 04:21) (114/76 - 136/80)  RR: 18 (07-29-18 @ 04:21) (18 - 20)  SpO2: 98% (07-29-18 @ 04:21) (97% - 100%)  Wt(kg): --  I&O's Summary    28 Jul 2018 07:01  -  29 Jul 2018 07:00  --------------------------------------------------------  IN: 660 mL / OUT: 0 mL / NET: 660 mL        Appearance: weak	  HEENT:   NCAT, PERRL, EOMI	  Lymphatic: No lymphadenopathy  Cardiovascular: Normal S1 S2, RRR  Respiratory: Lungs clear to auscultation BL  Psychiatry: A & O x 3, Mood & affect appropriate  Gastrointestinal:  Soft, Non-tender, + BS  Skin: No rashes, No ecchymoses, No cyanosis	  Neurologic: Non-focal  Extremities: Normal range of motion, No clubbing, cyanosis or edema    	  LABS:	 	    CARDIAC MARKERS:                                7.5    5.0   )-----------( 362      ( 29 Jul 2018 11:29 )             22.7     07-29    136  |  103  |  11  ----------------------------<  127<H>  3.6   |  22  |  0.50    Ca    7.8<L>      29 Jul 2018 05:56    TPro  7.8  /  Alb  3.6  /  TBili  1.3<H>  /  DBili  x   /  AST  28  /  ALT  18  /  AlkPhos  145<H>  07-28    proBNP: Serum Pro-Brain Natriuretic Peptide: 28 pg/mL (07-28 @ 14:00)    Lipid Profile:   HgA1c:   TSH: Thyroid Stimulating Hormone, Serum: 1.63 uIU/mL (07-29 @ 08:39)  Thyroid Stimulating Hormone, Serum: 1.16 uIU/mL (07-28 @ 15:01)
CHIEF COMPLAINT:Patient is a 58y old  Female who presents with a chief complaint of fatigue and tachycardia  	  Interval history: confirmed babesiosis      Allergies:  No Known Allergies      PAST MEDICAL & SURGICAL HISTORY:  Essential hypertension  Type 2 diabetes mellitus with other neurologic complication, without long-term current use of insulin      FAMILY HISTORY:      REVIEW OF SYSTEMS:  CONSTITUTIONAL: No fever, weight loss, + fatigue  EYES: No eye pain, visual disturbances, or discharge  NECK: No pain or stiffness  RESPIRATORY: No cough or wheezing, no shortness of breath  CARDIOVASCULAR: No chest pain, no palpitations, no dizziness, no leg swelling  GASTROINTESTINAL: No abdominal or epigastric pain. No nausea, vomiting, diarrhea or constipation  GENITOURINARY: No dysuria, urinary frequency or urgency, no hematuria  NEUROLOGICAL: No headaches, memory loss, loss of strength, numbness, or tremors  SKIN: No itching, burning, rashes, or lesions   MUSCULOSKELETAL: No joint pain or swelling; No muscle, back, or extremity pain      Medications:  MEDICATIONS  (STANDING):  atorvastatin 10 milliGRAM(s) Oral at bedtime  atovaquone Suspension 750 milliGRAM(s) Oral two times a day  azithromycin  IVPB 250 milliGRAM(s) IV Intermittent every 24 hours  dextrose 5%. 1000 milliLiter(s) (50 mL/Hr) IV Continuous <Continuous>  dextrose 50% Injectable 12.5 Gram(s) IV Push once  dextrose 50% Injectable 25 Gram(s) IV Push once  dextrose 50% Injectable 25 Gram(s) IV Push once  insulin lispro (HumaLOG) corrective regimen sliding scale   SubCutaneous three times a day before meals    MEDICATIONS  (PRN):  dextrose 40% Gel 15 Gram(s) Oral once PRN Blood Glucose LESS THAN 70 milliGRAM(s)/deciliter  glucagon  Injectable 1 milliGRAM(s) IntraMuscular once PRN Glucose LESS THAN 70 milligrams/deciliter    	    PHYSICAL EXAM:  T(C): 37.1 (07-31-18 @ 11:32), Max: 37.1 (07-31-18 @ 11:32)  HR: 100 (07-31-18 @ 11:32) (88 - 102)  BP: 108/69 (07-31-18 @ 11:32) (105/72 - 109/71)  RR: 18 (07-31-18 @ 11:32) (18 - 18)  SpO2: 97% (07-31-18 @ 11:32) (97% - 99%)  Wt(kg): --  I&O's Summary    30 Jul 2018 07:01  -  31 Jul 2018 07:00  --------------------------------------------------------  IN: 1080 mL / OUT: 0 mL / NET: 1080 mL    31 Jul 2018 07:01  -  31 Jul 2018 12:02  --------------------------------------------------------  IN: 120 mL / OUT: 0 mL / NET: 120 mL      Appearance: weak	  HEENT:   NCAT, PERRL, EOMI	  Lymphatic: No lymphadenopathy  Cardiovascular: Normal S1 S2, RRR  Respiratory: Lungs clear to auscultation BL  Psychiatry: A & O x 3, Mood & affect appropriate  Gastrointestinal:  Soft, Non-tender, + BS  Skin: No rashes, No ecchymoses, No cyanosis	  Neurologic: Non-focal  Extremities: Normal range of motion, No clubbing, cyanosis or edema    LABS:	 	    CARDIAC MARKERS:                                7.5    4.3   )-----------( 397      ( 31 Jul 2018 05:27 )             23.6     07-31    138  |  106  |  <4<L>  ----------------------------<  134<H>  4.4   |  25  |  0.51    Ca    8.6      31 Jul 2018 05:27    TPro  x   /  Alb  x   /  TBili  1.1  /  DBili  0.3<H>  /  AST  x   /  ALT  x   /  AlkPhos  x   07-29    proBNP:   Lipid Profile:   HgA1c:   TSH:
CHIEF COMPLAINT:Patient is a 58y old  Female who presents with a chief complaint of fatigue and tachycardia  	  Interval history: feels better      Allergies:  No Known Allergies      PAST MEDICAL & SURGICAL HISTORY:  Essential hypertension  Type 2 diabetes mellitus with other neurologic complication, without long-term current use of insulin      FAMILY HISTORY:      REVIEW OF SYSTEMS:  CONSTITUTIONAL: No fever, weight loss, + fatigue  EYES: No eye pain, visual disturbances, or discharge  NECK: No pain or stiffness  RESPIRATORY: No cough or wheezing, no shortness of breath  CARDIOVASCULAR: No chest pain, no palpitations, no dizziness, no leg swelling  GASTROINTESTINAL: No abdominal or epigastric pain. No nausea, vomiting, diarrhea or constipation  GENITOURINARY: No dysuria, urinary frequency or urgency, no hematuria  NEUROLOGICAL: No headaches, memory loss, loss of strength, numbness, or tremors  SKIN: No itching, burning, rashes, or lesions   MUSCULOSKELETAL: No joint pain or swelling; No muscle, back, or extremity pain      Medications:  MEDICATIONS  (STANDING):  atorvastatin 10 milliGRAM(s) Oral at bedtime  atovaquone Suspension 750 milliGRAM(s) Oral two times a day  azithromycin  IVPB 250 milliGRAM(s) IV Intermittent every 24 hours  dextrose 5%. 1000 milliLiter(s) (50 mL/Hr) IV Continuous <Continuous>  dextrose 50% Injectable 12.5 Gram(s) IV Push once  dextrose 50% Injectable 25 Gram(s) IV Push once  dextrose 50% Injectable 25 Gram(s) IV Push once  insulin lispro (HumaLOG) corrective regimen sliding scale   SubCutaneous three times a day before meals    MEDICATIONS  (PRN):  dextrose 40% Gel 15 Gram(s) Oral once PRN Blood Glucose LESS THAN 70 milliGRAM(s)/deciliter  glucagon  Injectable 1 milliGRAM(s) IntraMuscular once PRN Glucose LESS THAN 70 milligrams/deciliter    	    PHYSICAL EXAM:  T(C): 36.6 (08-01-18 @ 11:15), Max: 36.9 (08-01-18 @ 04:14)  HR: 92 (08-01-18 @ 11:15) (75 - 120)  BP: 100/64 (08-01-18 @ 11:15) (100/64 - 127/80)  RR: 18 (08-01-18 @ 11:15) (18 - 18)  SpO2: 99% (08-01-18 @ 11:15) (99% - 99%)  Wt(kg): --  I&O's Summary    31 Jul 2018 07:01  -  01 Aug 2018 07:00  --------------------------------------------------------  IN: 880 mL / OUT: 0 mL / NET: 880 mL    01 Aug 2018 07:01  -  01 Aug 2018 11:51  --------------------------------------------------------  IN: 300 mL / OUT: 0 mL / NET: 300 mL      Appearance: weak	  HEENT:   NCAT, PERRL, EOMI	  Lymphatic: No lymphadenopathy  Cardiovascular: Normal S1 S2, RRR  Respiratory: Lungs clear to auscultation BL  Psychiatry: A & O x 3, Mood & affect appropriate  Gastrointestinal:  Soft, Non-tender, + BS  Skin: No rashes, No ecchymoses, No cyanosis	  Neurologic: Non-focal  Extremities: Normal range of motion, No clubbing, cyanosis or edema    LABS:	 	    CARDIAC MARKERS:                                8.2    4.5   )-----------( 443      ( 01 Aug 2018 05:58 )             26.5     08-01    137  |  102  |  10  ----------------------------<  138<H>  3.9   |  24  |  0.48<L>    Ca    8.5      01 Aug 2018 05:58      proBNP:   Lipid Profile:   HgA1c:   TSH:
CHIEF COMPLAINT:Patient is a 58y old  Female who presents with a chief complaint of fatigue and tachycardia  	  Interval history: likely babesiosis      Allergies:  No Known Allergies      PAST MEDICAL & SURGICAL HISTORY:  Essential hypertension  Type 2 diabetes mellitus with other neurologic complication, without long-term current use of insulin      FAMILY HISTORY:      REVIEW OF SYSTEMS:  CONSTITUTIONAL: No fever, weight loss, + fatigue  EYES: No eye pain, visual disturbances, or discharge  NECK: No pain or stiffness  RESPIRATORY: No cough or wheezing, no shortness of breath  CARDIOVASCULAR: No chest pain, + palpitations, + dizziness, no leg swelling  GASTROINTESTINAL: No abdominal or epigastric pain. No nausea, vomiting, diarrhea or constipation  GENITOURINARY: No dysuria, urinary frequency or urgency, no hematuria  NEUROLOGICAL: No headaches, memory loss, loss of strength, numbness, or tremors  SKIN: No itching, burning, rashes, or lesions   MUSCULOSKELETAL: No joint pain or swelling; No muscle, back, or extremity pain      Medications:  MEDICATIONS  (STANDING):  atorvastatin 10 milliGRAM(s) Oral at bedtime  clindamycin IVPB      clindamycin IVPB 600 milliGRAM(s) IV Intermittent once  clindamycin IVPB 600 milliGRAM(s) IV Intermittent every 8 hours  dextrose 5%. 1000 milliLiter(s) (50 mL/Hr) IV Continuous <Continuous>  dextrose 50% Injectable 12.5 Gram(s) IV Push once  dextrose 50% Injectable 25 Gram(s) IV Push once  dextrose 50% Injectable 25 Gram(s) IV Push once  insulin lispro (HumaLOG) corrective regimen sliding scale   SubCutaneous three times a day before meals  sodium chloride 0.9%. 1000 milliLiter(s) (60 mL/Hr) IV Continuous <Continuous>    MEDICATIONS  (PRN):  dextrose 40% Gel 15 Gram(s) Oral once PRN Blood Glucose LESS THAN 70 milliGRAM(s)/deciliter  glucagon  Injectable 1 milliGRAM(s) IntraMuscular once PRN Glucose LESS THAN 70 milligrams/deciliter    	    PHYSICAL EXAM:  T(C): 37 (07-30-18 @ 12:11), Max: 37 (07-29-18 @ 20:35)  HR: 99 (07-30-18 @ 12:11) (99 - 112)  BP: 109/71 (07-30-18 @ 12:11) (109/71 - 129/78)  RR: 18 (07-30-18 @ 12:11) (18 - 18)  SpO2: 99% (07-30-18 @ 12:11) (98% - 99%)  Wt(kg): --  I&O's Summary    29 Jul 2018 07:01  -  30 Jul 2018 07:00  --------------------------------------------------------  IN: 1200 mL / OUT: 0 mL / NET: 1200 mL    30 Jul 2018 07:01  -  30 Jul 2018 15:14  --------------------------------------------------------  IN: 120 mL / OUT: 0 mL / NET: 120 mL      Appearance: weak	  HEENT:   NCAT, PERRL, EOMI	  Lymphatic: No lymphadenopathy  Cardiovascular: Normal S1 S2, RRR  Respiratory: Lungs clear to auscultation BL  Psychiatry: A & O x 3, Mood & affect appropriate  Gastrointestinal:  Soft, Non-tender, + BS  Skin: No rashes, No ecchymoses, No cyanosis	  Neurologic: Non-focal  Extremities: Normal range of motion, No clubbing, cyanosis or edema    LABS:	 	    CARDIAC MARKERS:                                7.0    4.2   )-----------( 349      ( 30 Jul 2018 10:17 )             22.1     07-30    138  |  103  |  5<L>  ----------------------------<  119<H>  3.3<L>   |  24  |  0.45<L>    Ca    8.1<L>      30 Jul 2018 05:47    TPro  x   /  Alb  x   /  TBili  1.1  /  DBili  0.3<H>  /  AST  x   /  ALT  x   /  AlkPhos  x   07-29    proBNP:   Lipid Profile:   HgA1c:   TSH:
INTERVAL HPI/OVERNIGHT EVENTS: feels weak otherwise well, on clears after colonoscopy prep but colonoscopy on hold due to possibility of Babesiosis    MEDICATIONS  (STANDING):  atorvastatin 10 milliGRAM(s) Oral at bedtime  atovaquone Suspension 750 milliGRAM(s) Oral two times a day  azithromycin  IVPB 250 milliGRAM(s) IV Intermittent every 24 hours  dextrose 5%. 1000 milliLiter(s) (50 mL/Hr) IV Continuous <Continuous>  dextrose 50% Injectable 12.5 Gram(s) IV Push once  dextrose 50% Injectable 25 Gram(s) IV Push once  dextrose 50% Injectable 25 Gram(s) IV Push once  insulin lispro (HumaLOG) corrective regimen sliding scale   SubCutaneous three times a day before meals  sodium chloride 0.9%. 1000 milliLiter(s) (60 mL/Hr) IV Continuous <Continuous>    MEDICATIONS  (PRN):  dextrose 40% Gel 15 Gram(s) Oral once PRN Blood Glucose LESS THAN 70 milliGRAM(s)/deciliter  glucagon  Injectable 1 milliGRAM(s) IntraMuscular once PRN Glucose LESS THAN 70 milligrams/deciliter      Allergies    No Known Allergies    Intolerances            PHYSICAL EXAM:   Vital Signs:  Vital Signs Last 24 Hrs  T(C): 36.9 (31 Jul 2018 04:15), Max: 37 (30 Jul 2018 12:11)  T(F): 98.4 (31 Jul 2018 04:15), Max: 98.6 (30 Jul 2018 12:11)  HR: 88 (31 Jul 2018 04:15) (88 - 102)  BP: 105/72 (31 Jul 2018 04:15) (105/72 - 109/71)  BP(mean): --  RR: 18 (31 Jul 2018 04:15) (18 - 18)  SpO2: 99% (31 Jul 2018 04:15) (98% - 99%)  Daily     Daily     GENERAL:  no distress  HEENT:  NC/AT,  anicteric  CHEST:   no increased effort, breath sounds clear  HEART:  Regular rhythm  ABDOMEN:  Soft, non-tender, non-distended, normoactive bowel sounds,  no masses ,no hepato-splenomegaly, no signs of chronic liver disease  EXTEREMITIES:  no cyanosis      LABS:                        7.5    4.3   )-----------( 397      ( 31 Jul 2018 05:27 )             23.6     07-31    138  |  106  |  <4<L>  ----------------------------<  134<H>  4.4   |  25  |  0.51    Ca    8.6      31 Jul 2018 05:27    TPro  x   /  Alb  x   /  TBili  1.1  /  DBili  0.3<H>  /  AST  x   /  ALT  x   /  AlkPhos  x   07-29          RADIOLOGY & ADDITIONAL TESTS:
INTERVAL HPI/OVERNIGHT EVENTS: feels well, babesiosis confirmed, d/c planning for today    MEDICATIONS  (STANDING):  atorvastatin 10 milliGRAM(s) Oral at bedtime  atovaquone Suspension 750 milliGRAM(s) Oral two times a day  azithromycin  IVPB 250 milliGRAM(s) IV Intermittent every 24 hours  dextrose 5%. 1000 milliLiter(s) (50 mL/Hr) IV Continuous <Continuous>  dextrose 50% Injectable 12.5 Gram(s) IV Push once  dextrose 50% Injectable 25 Gram(s) IV Push once  dextrose 50% Injectable 25 Gram(s) IV Push once  insulin lispro (HumaLOG) corrective regimen sliding scale   SubCutaneous three times a day before meals    MEDICATIONS  (PRN):  dextrose 40% Gel 15 Gram(s) Oral once PRN Blood Glucose LESS THAN 70 milliGRAM(s)/deciliter  glucagon  Injectable 1 milliGRAM(s) IntraMuscular once PRN Glucose LESS THAN 70 milligrams/deciliter      Allergies    No Known Allergies    Intolerances            PHYSICAL EXAM:   Vital Signs:  Vital Signs Last 24 Hrs  T(C): 36.9 (01 Aug 2018 04:14), Max: 37.1 (2018 11:32)  T(F): 98.4 (01 Aug 2018 04:14), Max: 98.8 (2018 11:32)  HR: 75 (01 Aug 2018 04:14) (75 - 120)  BP: 110/71 (01 Aug 2018 04:14) (108/69 - 127/80)  BP(mean): --  RR: 18 (01 Aug 2018 04:14) (18 - 18)  SpO2: 99% (01 Aug 2018 04:14) (97% - 99%)  Daily     Daily Weight in k.4 (01 Aug 2018 07:55)    GENERAL:  no distress  HEENT:  NC/AT,  anicteric  CHEST:   no increased effort, breath sounds clear  HEART:  Regular rhythm  ABDOMEN:  Soft, non-tender, non-distended, normoactive bowel sounds,  no masses ,no hepato-splenomegaly, no signs of chronic liver disease  EXTEREMITIES:  no cyanosis      LABS:                        8.2    4.5   )-----------( 443      ( 01 Aug 2018 05:58 )             26.5     08-    137  |  102  |  10  ----------------------------<  138<H>  3.9   |  24  |  0.48<L>    Ca    8.5      01 Aug 2018 05:58            RADIOLOGY & ADDITIONAL TESTS:
LEWIS COSME:[id number],   58yFemale followed for:  No Known Allergies    PAST MEDICAL & SURGICAL HISTORY:  Essential hypertension  Type 2 diabetes mellitus with other neurologic complication, without long-term current use of insulin    FAMILY HISTORY:    MEDICATIONS  (STANDING):  atorvastatin 10 milliGRAM(s) Oral at bedtime  dextrose 5%. 1000 milliLiter(s) (50 mL/Hr) IV Continuous <Continuous>  dextrose 50% Injectable 12.5 Gram(s) IV Push once  dextrose 50% Injectable 25 Gram(s) IV Push once  dextrose 50% Injectable 25 Gram(s) IV Push once  insulin lispro (HumaLOG) corrective regimen sliding scale   SubCutaneous three times a day before meals  sodium chloride 0.9%. 1000 milliLiter(s) (60 mL/Hr) IV Continuous <Continuous>    MEDICATIONS  (PRN):  dextrose 40% Gel 15 Gram(s) Oral once PRN Blood Glucose LESS THAN 70 milliGRAM(s)/deciliter  glucagon  Injectable 1 milliGRAM(s) IntraMuscular once PRN Glucose LESS THAN 70 milligrams/deciliter      Vital Signs Last 24 Hrs  T(C): 36.9 (30 Jul 2018 04:26), Max: 37 (29 Jul 2018 20:35)  T(F): 98.5 (30 Jul 2018 04:26), Max: 98.6 (29 Jul 2018 20:35)  HR: 107 (30 Jul 2018 04:26) (94 - 112)  BP: 110/70 (30 Jul 2018 04:26) (110/70 - 129/78)  BP(mean): --  RR: 18 (30 Jul 2018 04:26) (17 - 18)  SpO2: 98% (30 Jul 2018 04:26) (96% - 98%)  nc/at  s1s2  cta  soft, nt, nd no guarding or rebound  no c/c/e    CBC Full  -  ( 29 Jul 2018 11:29 )  WBC Count : 5.0 K/uL  Hemoglobin : 7.5 g/dL  Hematocrit : 22.7 %  Platelet Count - Automated : 362 K/uL  Mean Cell Volume : 97.6 fl  Mean Cell Hemoglobin : 32.3 pg  Mean Cell Hemoglobin Concentration : 33.1 gm/dL  Auto Neutrophil # : x  Auto Lymphocyte # : x  Auto Monocyte # : x  Auto Eosinophil # : x  Auto Basophil # : x  Auto Neutrophil % : x  Auto Lymphocyte % : x  Auto Monocyte % : x  Auto Eosinophil % : x  Auto Basophil % : x    07-30    138  |  103  |  5<L>  ----------------------------<  119<H>  3.3<L>   |  24  |  0.45<L>    Ca    8.1<L>      30 Jul 2018 05:47    TPro  x   /  Alb  x   /  TBili  1.1  /  DBili  0.3<H>  /  AST  x   /  ALT  x   /  AlkPhos  x   07-29    PT/INR - ( 28 Jul 2018 14:00 )   PT: 11.4 sec;   INR: 1.04 ratio         PTT - ( 28 Jul 2018 14:00 )  PTT:31.4 sec
Subjective: Patient seen and examined. No new events except as noted.     SUBJECTIVE/ROS:  No chest pain, dyspnea, palpitation, or dizziness.       MEDICATIONS:  MEDICATIONS  (STANDING):  atorvastatin 10 milliGRAM(s) Oral at bedtime  atovaquone Suspension 750 milliGRAM(s) Oral two times a day  azithromycin  IVPB 250 milliGRAM(s) IV Intermittent every 24 hours  dextrose 5%. 1000 milliLiter(s) (50 mL/Hr) IV Continuous <Continuous>  dextrose 50% Injectable 12.5 Gram(s) IV Push once  dextrose 50% Injectable 25 Gram(s) IV Push once  dextrose 50% Injectable 25 Gram(s) IV Push once  insulin lispro (HumaLOG) corrective regimen sliding scale   SubCutaneous three times a day before meals      PHYSICAL EXAM:  T(C): 37.1 (07-31-18 @ 11:32), Max: 37.1 (07-31-18 @ 11:32)  HR: 100 (07-31-18 @ 11:32) (88 - 102)  BP: 108/69 (07-31-18 @ 11:32) (105/72 - 108/71)  RR: 18 (07-31-18 @ 11:32) (18 - 18)  SpO2: 97% (07-31-18 @ 11:32) (97% - 99%)  Wt(kg): --  I&O's Summary    30 Jul 2018 07:01  -  31 Jul 2018 07:00  --------------------------------------------------------  IN: 1080 mL / OUT: 0 mL / NET: 1080 mL    31 Jul 2018 07:01  -  31 Jul 2018 16:57  --------------------------------------------------------  IN: 240 mL / OUT: 0 mL / NET: 240 mL          JVP: Normal  Neck: supple  Lung: clear   CV: S1 S2 , Murmur:  Abd: soft  Ext: No edema  neuro: Awake / alert  Psych: flat affect  Skin: normal       LABS/DATA:    CARDIAC MARKERS:                                7.5    4.3   )-----------( 397      ( 31 Jul 2018 05:27 )             23.6     07-31    138  |  106  |  <4<L>  ----------------------------<  134<H>  4.4   |  25  |  0.51    Ca    8.6      31 Jul 2018 05:27      proBNP:   Lipid Profile:   HgA1c:   TSH:     TELE:  EKG:    < from: Transthoracic Echocardiogram (07.30.18 @ 08:06) >  Conclusions:  1. Normal left ventricular internal dimensions and wall  thicknesses.  2. Normal left ventricular systolic function. No segmental  wall motion abnormalities.  3. Normal diastolic function  4. Normal right ventricular size and function.  *** No previous Echo exam.    < end of copied text >
Subjective: Patient seen and examined. No new events except as noted.     SUBJECTIVE/ROS:  no palpitation  feels ok      MEDICATIONS:  MEDICATIONS  (STANDING):  atorvastatin 10 milliGRAM(s) Oral at bedtime  dextrose 5%. 1000 milliLiter(s) (50 mL/Hr) IV Continuous <Continuous>  dextrose 50% Injectable 12.5 Gram(s) IV Push once  dextrose 50% Injectable 25 Gram(s) IV Push once  dextrose 50% Injectable 25 Gram(s) IV Push once  insulin lispro (HumaLOG) corrective regimen sliding scale   SubCutaneous three times a day before meals  sodium chloride 0.9%. 1000 milliLiter(s) (60 mL/Hr) IV Continuous <Continuous>      PHYSICAL EXAM:  T(C): 36.9 (07-30-18 @ 04:26), Max: 37 (07-29-18 @ 20:35)  HR: 107 (07-30-18 @ 04:26) (94 - 112)  BP: 110/70 (07-30-18 @ 04:26) (110/70 - 129/78)  RR: 18 (07-30-18 @ 04:26) (17 - 18)  SpO2: 98% (07-30-18 @ 04:26) (96% - 98%)  Wt(kg): --  I&O's Summary    29 Jul 2018 07:01  -  30 Jul 2018 07:00  --------------------------------------------------------  IN: 1200 mL / OUT: 0 mL / NET: 1200 mL          JVP: Normal  Neck: supple  Lung: clear   CV: S1 S2 , Murmur:  Abd: soft  Ext: No edema  neuro: Awake / alert  Psych: flat affect  Skin: normal     LABS/DATA:    CARDIAC MARKERS:                                7.5    5.0   )-----------( 362      ( 29 Jul 2018 11:29 )             22.7     07-30    138  |  103  |  5<L>  ----------------------------<  119<H>  3.3<L>   |  24  |  0.45<L>    Ca    8.1<L>      30 Jul 2018 05:47    TPro  x   /  Alb  x   /  TBili  1.1  /  DBili  0.3<H>  /  AST  x   /  ALT  x   /  AlkPhos  x   07-29    proBNP:   Lipid Profile:   HgA1c: Hemoglobin A1C, Whole Blood: 4.5 % (07-29 @ 08:35)    TSH: Thyroid Stimulating Hormone, Serum: 1.63 uIU/mL (07-29 @ 08:39)      TELE:  EKG:
infectious diseases progress note:    Patient is a 58y old  Female who presents with a chief complaint of       Palpitations        ROS:  CONSTITUTIONAL:  Negative fever or chills, feels well, good appetite  EYES:  Negative  blurry vision or double vision  CARDIOVASCULAR:  Negative for chest pain or palpitations  RESPIRATORY:  Negative for cough, wheezing, or SOB   GASTROINTESTINAL:  Negative for nausea, vomiting, diarrhea, constipation, or abdominal pain  GENITOURINARY:  Negative frequency, urgency or dysuria  NEUROLOGIC:  No headache, confusion, dizziness, lightheadedness    Allergies    No Known Allergies    Intolerances        ANTIBIOTICS/RELEVANT:  antimicrobials  atovaquone Suspension 750 milliGRAM(s) Oral two times a day  azithromycin  IVPB 250 milliGRAM(s) IV Intermittent every 24 hours    immunologic:    OTHER:  atorvastatin 10 milliGRAM(s) Oral at bedtime  dextrose 40% Gel 15 Gram(s) Oral once PRN  dextrose 5%. 1000 milliLiter(s) IV Continuous <Continuous>  dextrose 50% Injectable 12.5 Gram(s) IV Push once  dextrose 50% Injectable 25 Gram(s) IV Push once  dextrose 50% Injectable 25 Gram(s) IV Push once  glucagon  Injectable 1 milliGRAM(s) IntraMuscular once PRN  insulin lispro (HumaLOG) corrective regimen sliding scale   SubCutaneous three times a day before meals      Objective:  Vital Signs Last 24 Hrs  T(C): 36.9 (01 Aug 2018 04:14), Max: 37.1 (31 Jul 2018 11:32)  T(F): 98.4 (01 Aug 2018 04:14), Max: 98.8 (31 Jul 2018 11:32)  HR: 75 (01 Aug 2018 04:14) (75 - 120)  BP: 110/71 (01 Aug 2018 04:14) (108/69 - 127/80)  BP(mean): --  RR: 18 (01 Aug 2018 04:14) (18 - 18)  SpO2: 99% (01 Aug 2018 04:14) (97% - 99%)    PHYSICAL EXAM:  Constitutional:Well-developed, well nourished--no acute distress  Eyes:LOBO, EOMI  Ear/Nose/Throat: no oral lesion, no sinus tenderness on percussion	  Neck:no JVD, no lymphadenopathy, supple  Respiratory: CTA isaias  Cardiovascular: S1S2 RRR, no murmurs  Gastrointestinal:soft, (+) BS, no HSM  Extremities:no e/e/c        LABS:                        8.2    4.5   )-----------( 443      ( 01 Aug 2018 05:58 )             26.5     08-01    137  |  102  |  10  ----------------------------<  138<H>  3.9   |  24  |  0.48<L>    Ca    8.5      01 Aug 2018 05:58              MICROBIOLOGY:    RECENT CULTURES:  07-30 @ 14:25 .Blood peripheral blood                Babesia species  by [facility] Stain  Parasitemia = < 1%          RESPIRATORY CULTURES:              RADIOLOGY & ADDITIONAL STUDIES:        Pager [phone]  After 5 pm/weekends or if no response :[phone]

## 2018-08-01 NOTE — PROGRESS NOTE ADULT - ASSESSMENT
pt w/ weakness fatigue found to be tachycardic and anemic  guauaic neg as per er  anemia w/u  gi eval noted  Hb drop noted  babesiosis comfirmed  ID eval noted  abx per ID  hold bp meds  TSH normal  echo  orthostatics neg  cards eval noted  tele  dm  fsg riss  hold oral meds  Heme eval noted  d/c planning

## 2018-08-02 PROBLEM — Z00.00 ENCOUNTER FOR PREVENTIVE HEALTH EXAMINATION: Status: ACTIVE | Noted: 2018-08-02

## 2018-08-02 LAB
B MICROTI IGG TITR SER: HIGH
B MICROTI IGM TITR SER: HIGH
BABESIA AB SER-ACNC: SIGNIFICANT CHANGE UP

## 2018-08-03 ENCOUNTER — OUTPATIENT (OUTPATIENT)
Dept: OUTPATIENT SERVICES | Facility: HOSPITAL | Age: 58
LOS: 1 days | Discharge: HOME | End: 2018-08-03

## 2018-08-03 DIAGNOSIS — D59.9 ACQUIRED HEMOLYTIC ANEMIA, UNSPECIFIED: ICD-10-CM

## 2018-08-03 PROBLEM — E11.49 TYPE 2 DIABETES MELLITUS WITH OTHER DIABETIC NEUROLOGICAL COMPLICATION: Chronic | Status: ACTIVE | Noted: 2018-07-28

## 2018-08-03 PROBLEM — I10 ESSENTIAL (PRIMARY) HYPERTENSION: Chronic | Status: ACTIVE | Noted: 2018-07-28

## 2018-08-13 ENCOUNTER — APPOINTMENT (OUTPATIENT)
Dept: HEMATOLOGY ONCOLOGY | Facility: CLINIC | Age: 58
End: 2018-08-13

## 2018-08-13 ENCOUNTER — RESULT REVIEW (OUTPATIENT)
Age: 58
End: 2018-08-13

## 2018-08-13 VITALS
BODY MASS INDEX: 19.77 KG/M2 | DIASTOLIC BLOOD PRESSURE: 84 MMHG | TEMPERATURE: 98.1 F | WEIGHT: 104.72 LBS | OXYGEN SATURATION: 100 % | RESPIRATION RATE: 16 BRPM | HEIGHT: 61.22 IN | HEART RATE: 81 BPM | SYSTOLIC BLOOD PRESSURE: 128 MMHG

## 2018-08-13 DIAGNOSIS — Z86.39 PERSONAL HISTORY OF OTHER ENDOCRINE, NUTRITIONAL AND METABOLIC DISEASE: ICD-10-CM

## 2018-08-13 DIAGNOSIS — E11.65 TYPE 2 DIABETES MELLITUS WITH HYPERGLYCEMIA: ICD-10-CM

## 2018-08-13 DIAGNOSIS — I10 ESSENTIAL (PRIMARY) HYPERTENSION: ICD-10-CM

## 2018-08-13 DIAGNOSIS — D59.4 OTHER NONAUTOIMMUNE HEMOLYTIC ANEMIAS: ICD-10-CM

## 2018-08-13 DIAGNOSIS — B60.0 OTHER NONAUTOIMMUNE HEMOLYTIC ANEMIAS: ICD-10-CM

## 2018-08-13 LAB
BASOPHILS # BLD AUTO: 0 K/UL — SIGNIFICANT CHANGE UP (ref 0–0.2)
BASOPHILS NFR BLD AUTO: 0.7 % — SIGNIFICANT CHANGE UP (ref 0–2)
EOSINOPHIL # BLD AUTO: 0.2 K/UL — SIGNIFICANT CHANGE UP (ref 0–0.5)
EOSINOPHIL NFR BLD AUTO: 3.3 % — SIGNIFICANT CHANGE UP (ref 0–6)
HCT VFR BLD CALC: 35.8 % — SIGNIFICANT CHANGE UP (ref 34.5–45)
HGB BLD-MCNC: 11.9 G/DL — SIGNIFICANT CHANGE UP (ref 11.5–15.5)
LYMPHOCYTES # BLD AUTO: 1.8 K/UL — SIGNIFICANT CHANGE UP (ref 1–3.3)
LYMPHOCYTES # BLD AUTO: 38.5 % — SIGNIFICANT CHANGE UP (ref 13–44)
MCHC RBC-ENTMCNC: 32 PG — SIGNIFICANT CHANGE UP (ref 27–34)
MCHC RBC-ENTMCNC: 33.2 G/DL — SIGNIFICANT CHANGE UP (ref 32–36)
MCV RBC AUTO: 96.4 FL — SIGNIFICANT CHANGE UP (ref 80–100)
MONOCYTES # BLD AUTO: 0.3 K/UL — SIGNIFICANT CHANGE UP (ref 0–0.9)
MONOCYTES NFR BLD AUTO: 6.3 % — SIGNIFICANT CHANGE UP (ref 2–14)
NEUTROPHILS # BLD AUTO: 2.4 K/UL — SIGNIFICANT CHANGE UP (ref 1.8–7.4)
NEUTROPHILS NFR BLD AUTO: 51.2 % — SIGNIFICANT CHANGE UP (ref 43–77)
PLATELET # BLD AUTO: 393 K/UL — SIGNIFICANT CHANGE UP (ref 150–400)
RBC # BLD: 3.72 M/UL — LOW (ref 3.8–5.2)
RBC # FLD: 14 % — SIGNIFICANT CHANGE UP (ref 10.3–14.5)
WBC # BLD: 4.7 K/UL — SIGNIFICANT CHANGE UP (ref 3.8–10.5)
WBC # FLD AUTO: 4.7 K/UL — SIGNIFICANT CHANGE UP (ref 3.8–10.5)

## 2018-08-13 RX ORDER — LOSARTAN POTASSIUM AND HYDROCHLOROTHIAZIDE 12.5; 5 MG/1; MG/1
50-12.5 TABLET ORAL DAILY
Qty: 30 | Refills: 0 | Status: ACTIVE | COMMUNITY
Start: 2018-08-13

## 2018-08-13 RX ORDER — METFORMIN HYDROCHLORIDE 500 MG/1
500 TABLET, COATED ORAL
Qty: 60 | Refills: 0 | Status: ACTIVE | COMMUNITY
Start: 2018-08-13

## 2018-08-13 RX ORDER — ATORVASTATIN CALCIUM 10 MG/1
10 TABLET, FILM COATED ORAL
Qty: 30 | Refills: 0 | Status: ACTIVE | COMMUNITY
Start: 2018-08-13

## 2018-08-13 RX ORDER — FOLIC ACID 1 MG/1
1 TABLET ORAL DAILY
Qty: 30 | Refills: 0 | Status: ACTIVE | COMMUNITY
Start: 2018-08-13 | End: 1900-01-01

## 2020-02-06 NOTE — CONSULT NOTE ADULT - ASSESSMENT
daughter at bedside.  pt Yi speaking, but daughter translating.  no blood or black stool, reports physical last month, no report of anemia.  likely tachcardia from anemia.  Recommend iron studies, stool guiac, never had colonoscopy. recommend colon on monday Patient/Parent(s)

## 2023-09-14 NOTE — H&P ADULT - NSHPPOADEEPVENOUSTHROMB_GEN_A_CORE
10-  [place]  [facility]    PERSONAL CHOICE  [phone]  
[facility] Meeting - Echo:   [id number]  9/14/23 -- 12/12/23  
Echo: added to future files  
no